# Patient Record
Sex: MALE | Race: WHITE | Employment: FULL TIME | URBAN - METROPOLITAN AREA
[De-identification: names, ages, dates, MRNs, and addresses within clinical notes are randomized per-mention and may not be internally consistent; named-entity substitution may affect disease eponyms.]

---

## 2024-06-30 ENCOUNTER — HOSPITAL ENCOUNTER (INPATIENT)
Age: 49
LOS: 4 days | Discharge: HOME | End: 2024-07-04
Attending: EMERGENCY MEDICINE | Admitting: SURGERY
Payer: COMMERCIAL

## 2024-06-30 ENCOUNTER — APPOINTMENT (OUTPATIENT)
Dept: CT IMAGING | Age: 49
End: 2024-06-30
Payer: COMMERCIAL

## 2024-06-30 ENCOUNTER — ANESTHESIA EVENT (OUTPATIENT)
Dept: OPERATING ROOM | Age: 49
End: 2024-06-30
Payer: COMMERCIAL

## 2024-06-30 ENCOUNTER — ANESTHESIA (OUTPATIENT)
Dept: OPERATING ROOM | Age: 49
End: 2024-06-30
Payer: COMMERCIAL

## 2024-06-30 ENCOUNTER — PREP FOR PROCEDURE (OUTPATIENT)
Dept: SURGERY | Age: 49
End: 2024-06-30

## 2024-06-30 DIAGNOSIS — R10.9 RIGHT SIDED ABDOMINAL PAIN: ICD-10-CM

## 2024-06-30 DIAGNOSIS — K56.609 SMALL BOWEL OBSTRUCTION (HCC): ICD-10-CM

## 2024-06-30 DIAGNOSIS — K45.8 HERNIA, INTERNAL: ICD-10-CM

## 2024-06-30 DIAGNOSIS — K56.600 PARTIAL SMALL BOWEL OBSTRUCTION (HCC): Primary | ICD-10-CM

## 2024-06-30 DIAGNOSIS — Q43.0 MECKEL'S DIVERTICULITIS: ICD-10-CM

## 2024-06-30 LAB
ALBUMIN SERPL-MCNC: 4.4 G/DL (ref 3.4–5)
ALBUMIN/GLOB SERPL: 1.6 {RATIO} (ref 1.1–2.2)
ALP SERPL-CCNC: 73 U/L (ref 40–129)
ALT SERPL-CCNC: 23 U/L (ref 10–40)
ANION GAP SERPL CALCULATED.3IONS-SCNC: 11 MMOL/L (ref 3–16)
AST SERPL-CCNC: 23 U/L (ref 15–37)
BASOPHILS # BLD: 0 K/UL (ref 0–0.2)
BASOPHILS NFR BLD: 0.4 %
BILIRUB SERPL-MCNC: 1.3 MG/DL (ref 0–1)
BILIRUB UR QL STRIP.AUTO: NEGATIVE
BUN SERPL-MCNC: 15 MG/DL (ref 7–20)
CALCIUM SERPL-MCNC: 9.5 MG/DL (ref 8.3–10.6)
CHLORIDE SERPL-SCNC: 99 MMOL/L (ref 99–110)
CLARITY UR: CLEAR
CO2 SERPL-SCNC: 25 MMOL/L (ref 21–32)
COLOR UR: YELLOW
CREAT SERPL-MCNC: 0.9 MG/DL (ref 0.9–1.3)
DEPRECATED RDW RBC AUTO: 13.5 % (ref 12.4–15.4)
EOSINOPHIL # BLD: 0.2 K/UL (ref 0–0.6)
EOSINOPHIL NFR BLD: 1.7 %
GFR SERPLBLD CREATININE-BSD FMLA CKD-EPI: >90 ML/MIN/{1.73_M2}
GLUCOSE BLD-MCNC: 100 MG/DL (ref 70–99)
GLUCOSE SERPL-MCNC: 107 MG/DL (ref 70–99)
GLUCOSE UR STRIP.AUTO-MCNC: NEGATIVE MG/DL
HCT VFR BLD AUTO: 47.6 % (ref 40.5–52.5)
HGB BLD-MCNC: 16.2 G/DL (ref 13.5–17.5)
HGB UR QL STRIP.AUTO: NEGATIVE
KETONES UR STRIP.AUTO-MCNC: 15 MG/DL
LACTATE BLDV-SCNC: 1 MMOL/L (ref 0.4–2)
LEUKOCYTE ESTERASE UR QL STRIP.AUTO: NEGATIVE
LIPASE SERPL-CCNC: 18 U/L (ref 13–60)
LYMPHOCYTES # BLD: 1 K/UL (ref 1–5.1)
LYMPHOCYTES NFR BLD: 9.4 %
MCH RBC QN AUTO: 31.5 PG (ref 26–34)
MCHC RBC AUTO-ENTMCNC: 34 G/DL (ref 31–36)
MCV RBC AUTO: 92.8 FL (ref 80–100)
MONOCYTES # BLD: 0.7 K/UL (ref 0–1.3)
MONOCYTES NFR BLD: 6.6 %
NEUTROPHILS # BLD: 8.5 K/UL (ref 1.7–7.7)
NEUTROPHILS NFR BLD: 81.9 %
NITRITE UR QL STRIP.AUTO: NEGATIVE
PERFORMED ON: ABNORMAL
PH UR STRIP.AUTO: 6 [PH] (ref 5–8)
PLATELET # BLD AUTO: 190 K/UL (ref 135–450)
PMV BLD AUTO: 8.5 FL (ref 5–10.5)
POTASSIUM SERPL-SCNC: 3.9 MMOL/L (ref 3.5–5.1)
PROT SERPL-MCNC: 7.2 G/DL (ref 6.4–8.2)
PROT UR STRIP.AUTO-MCNC: NEGATIVE MG/DL
RBC # BLD AUTO: 5.13 M/UL (ref 4.2–5.9)
SODIUM SERPL-SCNC: 135 MMOL/L (ref 136–145)
SP GR UR STRIP.AUTO: 1.02 (ref 1–1.03)
UA COMPLETE W REFLEX CULTURE PNL UR: ABNORMAL
UA DIPSTICK W REFLEX MICRO PNL UR: ABNORMAL
URN SPEC COLLECT METH UR: ABNORMAL
UROBILINOGEN UR STRIP-ACNC: 0.2 E.U./DL
WBC # BLD AUTO: 10.4 K/UL (ref 4–11)

## 2024-06-30 PROCEDURE — 44120 REMOVAL OF SMALL INTESTINE: CPT | Performed by: SURGERY

## 2024-06-30 PROCEDURE — 81003 URINALYSIS AUTO W/O SCOPE: CPT

## 2024-06-30 PROCEDURE — 6360000002 HC RX W HCPCS: Performed by: ANESTHESIOLOGY

## 2024-06-30 PROCEDURE — 6360000002 HC RX W HCPCS: Performed by: SURGERY

## 2024-06-30 PROCEDURE — 2720000010 HC SURG SUPPLY STERILE: Performed by: SURGERY

## 2024-06-30 PROCEDURE — 3700000001 HC ADD 15 MINUTES (ANESTHESIA): Performed by: SURGERY

## 2024-06-30 PROCEDURE — 74177 CT ABD & PELVIS W/CONTRAST: CPT

## 2024-06-30 PROCEDURE — 83605 ASSAY OF LACTIC ACID: CPT

## 2024-06-30 PROCEDURE — 36415 COLL VENOUS BLD VENIPUNCTURE: CPT

## 2024-06-30 PROCEDURE — 2580000003 HC RX 258: Performed by: ANESTHESIOLOGY

## 2024-06-30 PROCEDURE — 2580000003 HC RX 258: Performed by: PHYSICIAN ASSISTANT

## 2024-06-30 PROCEDURE — 96375 TX/PRO/DX INJ NEW DRUG ADDON: CPT

## 2024-06-30 PROCEDURE — 2709999900 HC NON-CHARGEABLE SUPPLY: Performed by: SURGERY

## 2024-06-30 PROCEDURE — 85025 COMPLETE CBC W/AUTO DIFF WBC: CPT

## 2024-06-30 PROCEDURE — 6360000004 HC RX CONTRAST MEDICATION: Performed by: PHYSICIAN ASSISTANT

## 2024-06-30 PROCEDURE — 96365 THER/PROPH/DIAG IV INF INIT: CPT

## 2024-06-30 PROCEDURE — 96361 HYDRATE IV INFUSION ADD-ON: CPT

## 2024-06-30 PROCEDURE — 0DB80ZZ EXCISION OF SMALL INTESTINE, OPEN APPROACH: ICD-10-PCS | Performed by: SURGERY

## 2024-06-30 PROCEDURE — 1200000000 HC SEMI PRIVATE

## 2024-06-30 PROCEDURE — 83690 ASSAY OF LIPASE: CPT

## 2024-06-30 PROCEDURE — 7100000001 HC PACU RECOVERY - ADDTL 15 MIN: Performed by: SURGERY

## 2024-06-30 PROCEDURE — 96374 THER/PROPH/DIAG INJ IV PUSH: CPT

## 2024-06-30 PROCEDURE — 96372 THER/PROPH/DIAG INJ SC/IM: CPT

## 2024-06-30 PROCEDURE — C2626 INFUSION PUMP, NON-PROG,TEMP: HCPCS | Performed by: SURGERY

## 2024-06-30 PROCEDURE — 2500000003 HC RX 250 WO HCPCS: Performed by: ANESTHESIOLOGY

## 2024-06-30 PROCEDURE — 99223 1ST HOSP IP/OBS HIGH 75: CPT | Performed by: SURGERY

## 2024-06-30 PROCEDURE — 3700000000 HC ANESTHESIA ATTENDED CARE: Performed by: SURGERY

## 2024-06-30 PROCEDURE — 3600000013 HC SURGERY LEVEL 3 ADDTL 15MIN: Performed by: SURGERY

## 2024-06-30 PROCEDURE — 2500000003 HC RX 250 WO HCPCS: Performed by: PHYSICIAN ASSISTANT

## 2024-06-30 PROCEDURE — 80053 COMPREHEN METABOLIC PANEL: CPT

## 2024-06-30 PROCEDURE — 7100000000 HC PACU RECOVERY - FIRST 15 MIN: Performed by: SURGERY

## 2024-06-30 PROCEDURE — 99285 EMERGENCY DEPT VISIT HI MDM: CPT

## 2024-06-30 PROCEDURE — 6360000002 HC RX W HCPCS: Performed by: PHYSICIAN ASSISTANT

## 2024-06-30 PROCEDURE — 3600000003 HC SURGERY LEVEL 3 BASE: Performed by: SURGERY

## 2024-06-30 PROCEDURE — 88307 TISSUE EXAM BY PATHOLOGIST: CPT

## 2024-06-30 RX ORDER — LIDOCAINE HYDROCHLORIDE 20 MG/ML
INJECTION, SOLUTION INFILTRATION; PERINEURAL PRN
Status: DISCONTINUED | OUTPATIENT
Start: 2024-06-30 | End: 2024-06-30 | Stop reason: SDUPTHER

## 2024-06-30 RX ORDER — SODIUM CHLORIDE, SODIUM LACTATE, POTASSIUM CHLORIDE, CALCIUM CHLORIDE 600; 310; 30; 20 MG/100ML; MG/100ML; MG/100ML; MG/100ML
INJECTION, SOLUTION INTRAVENOUS CONTINUOUS PRN
Status: DISCONTINUED | OUTPATIENT
Start: 2024-06-30 | End: 2024-06-30 | Stop reason: SDUPTHER

## 2024-06-30 RX ORDER — ONDANSETRON 2 MG/ML
4 INJECTION INTRAMUSCULAR; INTRAVENOUS ONCE
Status: COMPLETED | OUTPATIENT
Start: 2024-06-30 | End: 2024-06-30

## 2024-06-30 RX ORDER — HYDROMORPHONE HYDROCHLORIDE 2 MG/ML
INJECTION, SOLUTION INTRAMUSCULAR; INTRAVENOUS; SUBCUTANEOUS PRN
Status: DISCONTINUED | OUTPATIENT
Start: 2024-06-30 | End: 2024-06-30 | Stop reason: SDUPTHER

## 2024-06-30 RX ORDER — MEPERIDINE HYDROCHLORIDE 50 MG/ML
12.5 INJECTION INTRAMUSCULAR; INTRAVENOUS; SUBCUTANEOUS EVERY 5 MIN PRN
Status: DISCONTINUED | OUTPATIENT
Start: 2024-06-30 | End: 2024-06-30 | Stop reason: HOSPADM

## 2024-06-30 RX ORDER — NALOXONE HYDROCHLORIDE 0.4 MG/ML
INJECTION, SOLUTION INTRAMUSCULAR; INTRAVENOUS; SUBCUTANEOUS PRN
Status: DISCONTINUED | OUTPATIENT
Start: 2024-06-30 | End: 2024-06-30 | Stop reason: HOSPADM

## 2024-06-30 RX ORDER — ENOXAPARIN SODIUM 100 MG/ML
40 INJECTION SUBCUTANEOUS EVERY 24 HOURS
Status: DISCONTINUED | OUTPATIENT
Start: 2024-06-30 | End: 2024-07-04 | Stop reason: HOSPADM

## 2024-06-30 RX ORDER — SODIUM CHLORIDE 0.9 % (FLUSH) 0.9 %
5-40 SYRINGE (ML) INJECTION EVERY 12 HOURS SCHEDULED
Status: DISCONTINUED | OUTPATIENT
Start: 2024-06-30 | End: 2024-06-30 | Stop reason: HOSPADM

## 2024-06-30 RX ORDER — MORPHINE SULFATE 4 MG/ML
4 INJECTION, SOLUTION INTRAMUSCULAR; INTRAVENOUS ONCE
Status: COMPLETED | OUTPATIENT
Start: 2024-06-30 | End: 2024-06-30

## 2024-06-30 RX ORDER — PROCHLORPERAZINE EDISYLATE 5 MG/ML
5 INJECTION INTRAMUSCULAR; INTRAVENOUS
Status: DISCONTINUED | OUTPATIENT
Start: 2024-06-30 | End: 2024-06-30 | Stop reason: HOSPADM

## 2024-06-30 RX ORDER — SUCCINYLCHOLINE CHLORIDE 20 MG/ML
INJECTION INTRAMUSCULAR; INTRAVENOUS PRN
Status: DISCONTINUED | OUTPATIENT
Start: 2024-06-30 | End: 2024-06-30 | Stop reason: SDUPTHER

## 2024-06-30 RX ORDER — KETAMINE HCL IN NACL, ISO-OSM 100MG/10ML
SYRINGE (ML) INJECTION PRN
Status: DISCONTINUED | OUTPATIENT
Start: 2024-06-30 | End: 2024-06-30 | Stop reason: SDUPTHER

## 2024-06-30 RX ORDER — ROCURONIUM BROMIDE 10 MG/ML
INJECTION, SOLUTION INTRAVENOUS PRN
Status: DISCONTINUED | OUTPATIENT
Start: 2024-06-30 | End: 2024-06-30 | Stop reason: SDUPTHER

## 2024-06-30 RX ORDER — ONDANSETRON 2 MG/ML
4 INJECTION INTRAMUSCULAR; INTRAVENOUS
Status: DISCONTINUED | OUTPATIENT
Start: 2024-06-30 | End: 2024-06-30 | Stop reason: HOSPADM

## 2024-06-30 RX ORDER — DEXAMETHASONE SODIUM PHOSPHATE 4 MG/ML
INJECTION, SOLUTION INTRA-ARTICULAR; INTRALESIONAL; INTRAMUSCULAR; INTRAVENOUS; SOFT TISSUE PRN
Status: DISCONTINUED | OUTPATIENT
Start: 2024-06-30 | End: 2024-06-30 | Stop reason: SDUPTHER

## 2024-06-30 RX ORDER — SODIUM CHLORIDE 9 MG/ML
INJECTION, SOLUTION INTRAVENOUS CONTINUOUS
Status: DISCONTINUED | OUTPATIENT
Start: 2024-07-01 | End: 2024-07-04 | Stop reason: HOSPADM

## 2024-06-30 RX ORDER — ONDANSETRON 2 MG/ML
INJECTION INTRAMUSCULAR; INTRAVENOUS PRN
Status: DISCONTINUED | OUTPATIENT
Start: 2024-06-30 | End: 2024-06-30 | Stop reason: SDUPTHER

## 2024-06-30 RX ORDER — OXYCODONE HYDROCHLORIDE 5 MG/1
5 TABLET ORAL
Status: DISCONTINUED | OUTPATIENT
Start: 2024-06-30 | End: 2024-06-30 | Stop reason: HOSPADM

## 2024-06-30 RX ORDER — ACETAMINOPHEN 650 MG/1
650 SUPPOSITORY RECTAL EVERY 6 HOURS PRN
Status: DISCONTINUED | OUTPATIENT
Start: 2024-06-30 | End: 2024-07-04 | Stop reason: HOSPADM

## 2024-06-30 RX ORDER — IPRATROPIUM BROMIDE AND ALBUTEROL SULFATE 2.5; .5 MG/3ML; MG/3ML
1 SOLUTION RESPIRATORY (INHALATION)
Status: DISCONTINUED | OUTPATIENT
Start: 2024-06-30 | End: 2024-06-30 | Stop reason: HOSPADM

## 2024-06-30 RX ORDER — KETOROLAC TROMETHAMINE 30 MG/ML
30 INJECTION, SOLUTION INTRAMUSCULAR; INTRAVENOUS EVERY 8 HOURS PRN
Status: DISCONTINUED | OUTPATIENT
Start: 2024-06-30 | End: 2024-07-04 | Stop reason: HOSPADM

## 2024-06-30 RX ORDER — SODIUM CHLORIDE 9 MG/ML
INJECTION, SOLUTION INTRAVENOUS PRN
Status: DISCONTINUED | OUTPATIENT
Start: 2024-06-30 | End: 2024-06-30 | Stop reason: HOSPADM

## 2024-06-30 RX ORDER — 0.9 % SODIUM CHLORIDE 0.9 %
1000 INTRAVENOUS SOLUTION INTRAVENOUS ONCE
Status: COMPLETED | OUTPATIENT
Start: 2024-06-30 | End: 2024-06-30

## 2024-06-30 RX ORDER — PROPOFOL 10 MG/ML
INJECTION, EMULSION INTRAVENOUS PRN
Status: DISCONTINUED | OUTPATIENT
Start: 2024-06-30 | End: 2024-06-30 | Stop reason: SDUPTHER

## 2024-06-30 RX ORDER — ONDANSETRON 2 MG/ML
4 INJECTION INTRAMUSCULAR; INTRAVENOUS EVERY 6 HOURS PRN
Status: DISCONTINUED | OUTPATIENT
Start: 2024-06-30 | End: 2024-07-04 | Stop reason: HOSPADM

## 2024-06-30 RX ORDER — BUPIVACAINE HYDROCHLORIDE 2.5 MG/ML
INJECTION, SOLUTION EPIDURAL; INFILTRATION; INTRACAUDAL PRN
Status: DISCONTINUED | OUTPATIENT
Start: 2024-06-30 | End: 2024-06-30 | Stop reason: ALTCHOICE

## 2024-06-30 RX ORDER — OXYCODONE HYDROCHLORIDE 5 MG/1
10 TABLET ORAL PRN
Status: DISCONTINUED | OUTPATIENT
Start: 2024-06-30 | End: 2024-06-30 | Stop reason: HOSPADM

## 2024-06-30 RX ORDER — SODIUM CHLORIDE 0.9 % (FLUSH) 0.9 %
5-40 SYRINGE (ML) INJECTION PRN
Status: DISCONTINUED | OUTPATIENT
Start: 2024-06-30 | End: 2024-06-30 | Stop reason: HOSPADM

## 2024-06-30 RX ORDER — FENTANYL CITRATE 50 UG/ML
INJECTION, SOLUTION INTRAMUSCULAR; INTRAVENOUS PRN
Status: DISCONTINUED | OUTPATIENT
Start: 2024-06-30 | End: 2024-06-30 | Stop reason: SDUPTHER

## 2024-06-30 RX ADMIN — HYDROMORPHONE HYDROCHLORIDE 0.5 MG: 1 INJECTION, SOLUTION INTRAMUSCULAR; INTRAVENOUS; SUBCUTANEOUS at 23:18

## 2024-06-30 RX ADMIN — ROCURONIUM BROMIDE 10 MG: 50 INJECTION, SOLUTION INTRAVENOUS at 22:04

## 2024-06-30 RX ADMIN — IOPAMIDOL 75 ML: 755 INJECTION, SOLUTION INTRAVENOUS at 17:52

## 2024-06-30 RX ADMIN — ENOXAPARIN SODIUM 40 MG: 100 INJECTION SUBCUTANEOUS at 19:38

## 2024-06-30 RX ADMIN — FAMOTIDINE 20 MG: 10 INJECTION, SOLUTION INTRAVENOUS at 16:38

## 2024-06-30 RX ADMIN — FENTANYL CITRATE 50 MCG: 50 INJECTION, SOLUTION INTRAMUSCULAR; INTRAVENOUS at 22:59

## 2024-06-30 RX ADMIN — LIDOCAINE HYDROCHLORIDE 100 MG: 20 INJECTION, SOLUTION INFILTRATION; PERINEURAL at 22:04

## 2024-06-30 RX ADMIN — SUCCINYLCHOLINE CHLORIDE 160 MG: 20 INJECTION, SOLUTION INTRAMUSCULAR; INTRAVENOUS at 22:04

## 2024-06-30 RX ADMIN — ONDANSETRON 4 MG: 2 INJECTION INTRAMUSCULAR; INTRAVENOUS at 22:37

## 2024-06-30 RX ADMIN — SODIUM CHLORIDE 1000 ML: 9 INJECTION, SOLUTION INTRAVENOUS at 16:38

## 2024-06-30 RX ADMIN — MORPHINE SULFATE 4 MG: 4 INJECTION, SOLUTION INTRAMUSCULAR; INTRAVENOUS at 16:38

## 2024-06-30 RX ADMIN — SODIUM CHLORIDE, SODIUM LACTATE, POTASSIUM CHLORIDE, AND CALCIUM CHLORIDE: .6; .31; .03; .02 INJECTION, SOLUTION INTRAVENOUS at 21:54

## 2024-06-30 RX ADMIN — HYDROMORPHONE HYDROCHLORIDE 1 MG: 2 INJECTION, SOLUTION INTRAMUSCULAR; INTRAVENOUS; SUBCUTANEOUS at 21:56

## 2024-06-30 RX ADMIN — MEROPENEM 1000 MG: 1 INJECTION, POWDER, FOR SOLUTION INTRAVENOUS at 19:13

## 2024-06-30 RX ADMIN — FENTANYL CITRATE 50 MCG: 50 INJECTION, SOLUTION INTRAMUSCULAR; INTRAVENOUS at 23:01

## 2024-06-30 RX ADMIN — ROCURONIUM BROMIDE 30 MG: 50 INJECTION, SOLUTION INTRAVENOUS at 22:11

## 2024-06-30 RX ADMIN — ROCURONIUM BROMIDE 10 MG: 50 INJECTION, SOLUTION INTRAVENOUS at 22:33

## 2024-06-30 RX ADMIN — DEXAMETHASONE SODIUM PHOSPHATE 4 MG: 4 INJECTION, SOLUTION INTRAMUSCULAR; INTRAVENOUS at 22:15

## 2024-06-30 RX ADMIN — HYDROMORPHONE HYDROCHLORIDE 1 MG: 2 INJECTION, SOLUTION INTRAMUSCULAR; INTRAVENOUS; SUBCUTANEOUS at 22:37

## 2024-06-30 RX ADMIN — HYDROMORPHONE HYDROCHLORIDE 0.5 MG: 1 INJECTION, SOLUTION INTRAMUSCULAR; INTRAVENOUS; SUBCUTANEOUS at 18:45

## 2024-06-30 RX ADMIN — ONDANSETRON 4 MG: 2 INJECTION INTRAMUSCULAR; INTRAVENOUS at 16:37

## 2024-06-30 RX ADMIN — Medication 25 MG: at 22:16

## 2024-06-30 RX ADMIN — SUGAMMADEX 200 MG: 100 INJECTION, SOLUTION INTRAVENOUS at 22:48

## 2024-06-30 RX ADMIN — PROPOFOL 200 MG: 10 INJECTION, EMULSION INTRAVENOUS at 22:04

## 2024-06-30 ASSESSMENT — PAIN SCALES - GENERAL
PAINLEVEL_OUTOF10: 8
PAINLEVEL_OUTOF10: 8
PAINLEVEL_OUTOF10: 0
PAINLEVEL_OUTOF10: 7
PAINLEVEL_OUTOF10: 8
PAINLEVEL_OUTOF10: 8
PAINLEVEL_OUTOF10: 6

## 2024-06-30 ASSESSMENT — PAIN - FUNCTIONAL ASSESSMENT: PAIN_FUNCTIONAL_ASSESSMENT: 0-10

## 2024-06-30 NOTE — ED NOTES
Placed a consult to General Surgery @ 1843  RE: partial SBO from internal hernia RLQ per KATY Rand Dr. called back @1858

## 2024-06-30 NOTE — ED PROVIDER NOTES
[facility]  ED  EMERGENCY DEPARTMENT ENCOUNTER        Pt Name: Levy La  MRN: [id number]  Birthdate 1975  Date of evaluation: 6/30/2024  Provider: ELDA ANSARI PA-C  PCP: No primary care provider on file.  ED Attending: DO Elena       I have seen and evaluated this patient with my supervising physician Ning Parker DO.    CHIEF COMPLAINT:     Chief Complaint   Patient presents with    Abdominal Pain     Pt to ED for c/o abdominal pain that started at 0330 this morning.        HISTORY OF PRESENT ILLNESS:      History provided by the patient. No limitations.    Levy La is a 48 y.o. male who arrives to the ED by private vehicle.  Patient arrives to the ED complaining of right-sided abdominal pain.  He woke up around 3 to 3:30 AM this morning with the pain.  He describes it as a \"burning\" sensation.  Initially, it seemed to be to the mid to lower abdomen.  Now it is more localized to the right side, specifically to the right lower quadrant.  He has been nauseous today, has not had an appetite.  He denies vomiting.  He denies diarrhea or bowel change.  He denies urinary symptoms.  He tried some over-the-counter antacids for symptoms without relief.  He is remotely had a kidney stone but states this does not feel like a kidney stone.  He states when he was 1 or 2 years old he had a right inguinal hernia repair.  When he was 9 to 11 years old he had a umbilical or ventral hernia repair with a scar seen at the umbilicus.  He denies other abdominal or pelvic surgeries.  Pain rated 6/10 on arrival.  He drinks alcohol socially last night.  He is not an everyday drinker.    Nursing Notes were reviewed     REVIEW OF SYSTEMS:     Review of Systems  Positives and pertinent negatives as per HPI.      PAST MEDICAL HISTORY:     Past Medical History:   Diagnosis Date    Kidney stone        SURGICAL HISTORY:      Past Surgical History:   Procedure Laterality Date    SHOULDER SURGERY    Reflex to Culture Not Indicated    Lactic Acid   Result Value Ref Range    Lactic Acid 1.0 0.4 - 2.0 mmol/L         RADIOLOGY:  All x-ray studies are viewed/reviewed by me.     Formal interpretations per the radiologist are as follows:    CT ABDOMEN PELVIS W IV CONTRAST Additional Contrast? None    Result Date: 6/30/2024  EXAMINATION: CT OF THE ABDOMEN AND PELVIS WITH CONTRAST 6/30/2024 5:44 pm TECHNIQUE: CT of the abdomen and pelvis was performed with the administration of intravenous contrast. Multiplanar reformatted images are provided for review. Automated exposure control, iterative reconstruction, and/or weight based adjustment of the mA/kV was utilized to reduce the radiation dose to as low as reasonably achievable. COMPARISON: None. HISTORY: ORDERING SYSTEM PROVIDED HISTORY: RLQ pain TECHNOLOGIST PROVIDED HISTORY: Reason for exam:->RLQ pain Additional Contrast?->None Decision Support Exception - unselect if not a suspected or confirmed emergency medical condition->Emergency Medical Condition (MA) Reason for Exam: right side abd pain today Additional signs and symptoms: nausea only Relevant Medical/Surgical History: hernia as a child FINDINGS:  topogram shows diffuse small bowel dilation. Lower chest: Heart size is normal. Lungs are mild hypoaeration. Liver: Liver is normal density. No enhancing masses.  Normal enhancement of the intrahepatic vasculature. Spleen:  Normal size.  No enhancing masses Pancreas: No enhancing masses.  No ductal dilation. No adjacent fatty stranding. Gallbladder no calcified stones or sludge.  No pericholecystic fluid.  No wall thickening. Bile ducts: No biliary ductal dilation Adrenals: The adrenal glands are unremarkable Kidneys: Kidneys are normal in appearance.  No hydronephrosis.  No enhancing masses. Norenal stones.  Kidneys are in the excretion phase GI: Stomach is unremarkable.  Proximal small bowel dilation.  The transition point is in the right upper quadrant.  This

## 2024-07-01 LAB
ANION GAP SERPL CALCULATED.3IONS-SCNC: 10 MMOL/L (ref 3–16)
BASOPHILS # BLD: 0 K/UL (ref 0–0.2)
BASOPHILS NFR BLD: 0.1 %
BUN SERPL-MCNC: 12 MG/DL (ref 7–20)
CALCIUM SERPL-MCNC: 8.6 MG/DL (ref 8.3–10.6)
CHLORIDE SERPL-SCNC: 102 MMOL/L (ref 99–110)
CO2 SERPL-SCNC: 24 MMOL/L (ref 21–32)
CREAT SERPL-MCNC: 0.9 MG/DL (ref 0.9–1.3)
DEPRECATED RDW RBC AUTO: 13.5 % (ref 12.4–15.4)
EOSINOPHIL # BLD: 0 K/UL (ref 0–0.6)
EOSINOPHIL NFR BLD: 0.1 %
GFR SERPLBLD CREATININE-BSD FMLA CKD-EPI: >90 ML/MIN/{1.73_M2}
GLUCOSE BLD-MCNC: 114 MG/DL (ref 70–99)
GLUCOSE SERPL-MCNC: 141 MG/DL (ref 70–99)
HCT VFR BLD AUTO: 46.7 % (ref 40.5–52.5)
HGB BLD-MCNC: 15.3 G/DL (ref 13.5–17.5)
LYMPHOCYTES # BLD: 0.6 K/UL (ref 1–5.1)
LYMPHOCYTES NFR BLD: 3.9 %
MAGNESIUM SERPL-MCNC: 1.9 MG/DL (ref 1.8–2.4)
MCH RBC QN AUTO: 30.8 PG (ref 26–34)
MCHC RBC AUTO-ENTMCNC: 32.8 G/DL (ref 31–36)
MCV RBC AUTO: 93.9 FL (ref 80–100)
MONOCYTES # BLD: 1.1 K/UL (ref 0–1.3)
MONOCYTES NFR BLD: 7.2 %
NEUTROPHILS # BLD: 13.6 K/UL (ref 1.7–7.7)
NEUTROPHILS NFR BLD: 88.7 %
PERFORMED ON: ABNORMAL
PHOSPHATE SERPL-MCNC: 3.2 MG/DL (ref 2.5–4.9)
PLATELET # BLD AUTO: 161 K/UL (ref 135–450)
PMV BLD AUTO: 8.7 FL (ref 5–10.5)
POTASSIUM SERPL-SCNC: 4.6 MMOL/L (ref 3.5–5.1)
RBC # BLD AUTO: 4.97 M/UL (ref 4.2–5.9)
SODIUM SERPL-SCNC: 136 MMOL/L (ref 136–145)
WBC # BLD AUTO: 15.4 K/UL (ref 4–11)

## 2024-07-01 PROCEDURE — 2580000003 HC RX 258: Performed by: SURGERY

## 2024-07-01 PROCEDURE — 99024 POSTOP FOLLOW-UP VISIT: CPT | Performed by: SURGERY

## 2024-07-01 PROCEDURE — 80048 BASIC METABOLIC PNL TOTAL CA: CPT

## 2024-07-01 PROCEDURE — 36415 COLL VENOUS BLD VENIPUNCTURE: CPT

## 2024-07-01 PROCEDURE — 83735 ASSAY OF MAGNESIUM: CPT

## 2024-07-01 PROCEDURE — 84100 ASSAY OF PHOSPHORUS: CPT

## 2024-07-01 PROCEDURE — 6370000000 HC RX 637 (ALT 250 FOR IP): Performed by: SURGERY

## 2024-07-01 PROCEDURE — 85025 COMPLETE CBC W/AUTO DIFF WBC: CPT

## 2024-07-01 PROCEDURE — 1200000000 HC SEMI PRIVATE

## 2024-07-01 PROCEDURE — 2500000003 HC RX 250 WO HCPCS: Performed by: SURGERY

## 2024-07-01 PROCEDURE — 6360000002 HC RX W HCPCS: Performed by: SURGERY

## 2024-07-01 RX ORDER — NICOTINE 21 MG/24HR
1 PATCH, TRANSDERMAL 24 HOURS TRANSDERMAL DAILY
Status: DISCONTINUED | OUTPATIENT
Start: 2024-07-01 | End: 2024-07-04 | Stop reason: HOSPADM

## 2024-07-01 RX ORDER — DIPHENHYDRAMINE HYDROCHLORIDE 50 MG/ML
25 INJECTION INTRAMUSCULAR; INTRAVENOUS EVERY 6 HOURS PRN
Status: DISCONTINUED | OUTPATIENT
Start: 2024-07-01 | End: 2024-07-04 | Stop reason: HOSPADM

## 2024-07-01 RX ADMIN — SODIUM CHLORIDE: 9 INJECTION, SOLUTION INTRAVENOUS at 00:09

## 2024-07-01 RX ADMIN — MEROPENEM 1000 MG: 1 INJECTION INTRAVENOUS at 16:00

## 2024-07-01 RX ADMIN — HYDROMORPHONE HYDROCHLORIDE 0.5 MG: 1 INJECTION, SOLUTION INTRAMUSCULAR; INTRAVENOUS; SUBCUTANEOUS at 22:18

## 2024-07-01 RX ADMIN — SODIUM CHLORIDE, PRESERVATIVE FREE 20 MG: 5 INJECTION INTRAVENOUS at 00:24

## 2024-07-01 RX ADMIN — KETOROLAC TROMETHAMINE 30 MG: 30 INJECTION INTRAMUSCULAR; INTRAVENOUS at 10:32

## 2024-07-01 RX ADMIN — SODIUM CHLORIDE: 9 INJECTION, SOLUTION INTRAVENOUS at 10:13

## 2024-07-01 RX ADMIN — HYDROMORPHONE HYDROCHLORIDE 0.5 MG: 1 INJECTION, SOLUTION INTRAMUSCULAR; INTRAVENOUS; SUBCUTANEOUS at 12:31

## 2024-07-01 RX ADMIN — KETOROLAC TROMETHAMINE 30 MG: 30 INJECTION INTRAMUSCULAR; INTRAVENOUS at 02:03

## 2024-07-01 RX ADMIN — MEROPENEM 1000 MG: 1 INJECTION INTRAVENOUS at 02:29

## 2024-07-01 RX ADMIN — ENOXAPARIN SODIUM 40 MG: 100 INJECTION SUBCUTANEOUS at 17:38

## 2024-07-01 RX ADMIN — SODIUM CHLORIDE, PRESERVATIVE FREE 20 MG: 5 INJECTION INTRAVENOUS at 21:01

## 2024-07-01 RX ADMIN — DIPHENHYDRAMINE HYDROCHLORIDE 25 MG: 50 INJECTION, SOLUTION INTRAMUSCULAR; INTRAVENOUS at 02:03

## 2024-07-01 RX ADMIN — KETOROLAC TROMETHAMINE 30 MG: 30 INJECTION INTRAMUSCULAR; INTRAVENOUS at 21:07

## 2024-07-01 RX ADMIN — SODIUM CHLORIDE: 9 INJECTION, SOLUTION INTRAVENOUS at 20:22

## 2024-07-01 RX ADMIN — SODIUM CHLORIDE, PRESERVATIVE FREE 20 MG: 5 INJECTION INTRAVENOUS at 07:30

## 2024-07-01 RX ADMIN — HYDROMORPHONE HYDROCHLORIDE 0.5 MG: 1 INJECTION, SOLUTION INTRAMUSCULAR; INTRAVENOUS; SUBCUTANEOUS at 07:30

## 2024-07-01 RX ADMIN — HYDROMORPHONE HYDROCHLORIDE 0.5 MG: 1 INJECTION, SOLUTION INTRAMUSCULAR; INTRAVENOUS; SUBCUTANEOUS at 17:45

## 2024-07-01 ASSESSMENT — PAIN DESCRIPTION - LOCATION
LOCATION: ABDOMEN;INCISION;THROAT
LOCATION: ABDOMEN
LOCATION: ABDOMEN
LOCATION: ABDOMEN;INCISION
LOCATION: ABDOMEN;INCISION
LOCATION: ABDOMEN
LOCATION: ABDOMEN
LOCATION: ABDOMEN;INCISION

## 2024-07-01 ASSESSMENT — PAIN DESCRIPTION - ORIENTATION
ORIENTATION: MID
ORIENTATION: ANTERIOR;LOWER
ORIENTATION: MID

## 2024-07-01 ASSESSMENT — PAIN - FUNCTIONAL ASSESSMENT
PAIN_FUNCTIONAL_ASSESSMENT: ACTIVITIES ARE NOT PREVENTED
PAIN_FUNCTIONAL_ASSESSMENT: PREVENTS OR INTERFERES SOME ACTIVE ACTIVITIES AND ADLS
PAIN_FUNCTIONAL_ASSESSMENT: ACTIVITIES ARE NOT PREVENTED

## 2024-07-01 ASSESSMENT — PAIN SCALES - GENERAL
PAINLEVEL_OUTOF10: 6
PAINLEVEL_OUTOF10: 6
PAINLEVEL_OUTOF10: 3
PAINLEVEL_OUTOF10: 5
PAINLEVEL_OUTOF10: 6
PAINLEVEL_OUTOF10: 7

## 2024-07-01 ASSESSMENT — PAIN DESCRIPTION - FREQUENCY: FREQUENCY: CONTINUOUS

## 2024-07-01 ASSESSMENT — PAIN DESCRIPTION - DESCRIPTORS
DESCRIPTORS: ACHING
DESCRIPTORS: ACHING
DESCRIPTORS: STABBING
DESCRIPTORS: ACHING

## 2024-07-01 ASSESSMENT — PAIN DESCRIPTION - ONSET: ONSET: GRADUAL

## 2024-07-01 NOTE — PROGRESS NOTES
4 Eyes Skin Assessment     The patient is being assess for  Shift Handoff    I agree that 2 RN's have performed a thorough Head to Toe Skin Assessment on the patient. ALL assessment sites listed below have been assessed.       Areas assessed by both nurses: FT  [x]   Head, Face, and Ears   [x]   Shoulders, Back, and Chest  [x]   Arms, Elbows, and Hands   [x]   Coccyx, Sacrum, and IschIum  [x]   Legs, Feet, and Heels        Does the Patient have Skin Breakdown?  No         [place] Prevention initiated:  No   Wound Care Orders initiated:  No      [facility] nurse consulted for Pressure Injury (Stage 3,4, Unstageable, DTI, NWPT, and Complex wounds), New and Established Ostomies:  No      Nurse 1 eSignature: Electronically signed by Huy Valadez RN on 7/1/24 at 3:08 AM EDT    **SHARE this note so that the co-signing nurse is able to place an eSignature**    Nurse 2 eSignature: Electronically signed by Raquel Guallpa RN on 7/1/24 at 10:07 AM EDT

## 2024-07-01 NOTE — PROGRESS NOTES
Patient educated on use of IS and demonstrates use Yes    Patient and family educated on incisional care and s/s of infection Yes    Patient and family educated on hand hygiene Yes    Patient and family educated on post operative care Yes   - NG tube maintenance    - Pain control    - castorena Removal        Day of surgery patient to side of bed or up to chair for minimum of 30 minutes No , patient educated on the benefits of early mobility  , would want to get up when the pain subsides       Patient performs oral hygiene with CHG oral solution until NG tube discontinued. Yes    If patient does not have NG tube in place, patient to brush teeth and use mouth wash Q shift NA    Patient receives daily bath and linen changes Yes    Patient receives CHG bath until castorena discontinued Yes    SCDs in place NA , chemical prophylaxis ordered     Patient receiving chemical VTE Yes

## 2024-07-01 NOTE — OP NOTE
[address] [address]                            OPERATIVE REPORT      PATIENT NAME: ARIELA ALMANZAR             : 1975  MED REC NO: [id number]                      ROOM: OR [place]  ACCOUNT NO: [id number]                       ADMIT DATE: 2024  PROVIDER: Neville Romano MD      DATE OF PROCEDURE:  2024    SURGEON:  Neville Romano MD    OPERATION PERFORMED:  Exploratory laparotomy with small-bowel resection.    PREOPERATIVE DIAGNOSIS:  Small-bowel obstruction.    POSTOPERATIVE DIAGNOSIS:  Small-bowel obstruction secondary to Meckel's diverticulitis.    ANESTHESIA:  General.    COMPLICATIONS:  None.    ESTIMATED BLOOD LOSS:  Less than 50 mL.    INDICATION FOR OPERATION:  A 48-year-old male presented with acute right-sided abdominal pain.  Imaging showed small bowel obstruction with suspicion of an internal hernia and possible closed-loop small bowel obstruction.  The patient was tender on the right side of his abdomen.  He was having pain despite persistent dosing of IV analgesics.  Given the CT imaging and exam findings, I recommended operative intervention.  The risks and benefits were explained.  The patient understood them, accepted them, and elected to proceed.    DESCRIPTION OF OPERATION:  The patient was brought to the operating room.  General anesthesia was induced.  He was prepped and draped in the usual surgical sterile fashion.  Midline incision was made.  The fascia was divided and the peritoneal cavity was entered.  There was no ascites.  I eviscerated the small bowel, which was dilated.  The distal small bowel was decompressed.  The point of transition was inflamed and indurated Meckel's diverticulum.  There was exudate and fluid consistent with infection along with the phlegmon associated with this.  This was all consistent with acute infection in the deep abdominal organ space related to  acute Meckel's diverticulitis.  On either side of the Meckel's diverticulitis, KANDACE 75 was used to divide the bowel.  Cecilia clamps and silk suture ligatures were used to divide the mesentery of the short segment small bowel resection.  The 2 small bowel loops were aligned and a side-to-side functional end-to-end anastomosis was created with a reload of the KANDACE 75 stapler.  The end hole was closed with a TX stapler.  Staple lines and mesenteric defect were oversewn with 3-0 silk suture.  Hemostasis was confirmed.  There was no other point of obstruction   or abnormality visualized from the ligament of Treitz to the terminal ileum.  NG tube was palpated in good position in the stomach.  A dual lumen On-Q PainBuster system was placed on both sides of the incision.  The midline fascia was closed with #1 PDS suture and tied in the middle.  Skin clips were used to reapproximate the skin.  Bandage was placed.    DISPOSITION:  The patient tolerated the procedure without any acute complication.          CARL GARCÍA MD      D:  06/30/2024 23:07:34     T:  06/30/2024 23:50:04     [facility]/S  Job #:  [id number]     Doc#:  [id number]

## 2024-07-01 NOTE — PROGRESS NOTES
Removed castorena cath, patient tolerated.  Patient unable to urinate at 1723, bladder scanner showing 336ml, Dr. Romano via perfect serve.  Orders to straight cath placed, 300ml output of yellow clear urine. Patient tolerated.

## 2024-07-01 NOTE — PROGRESS NOTES
[facility] GENERAL SURGERY    Surgery Progress Note           POD # 1    PATIENT NAME: Levy La     TODAY'S DATE: 7/1/2024    INTERVAL HISTORY:    Pt feels better.  Just complains of incisional pain.  Denies flatus yet.     OBJECTIVE:   VITALS:  BP (!) 150/80   Pulse 59   Temp 98 °F (36.7 °C) (Oral)   Resp 16   Ht 1.829 m (6')   Wt 97.5 kg (215 lb)   SpO2 95%   BMI 29.16 kg/m²     INTAKE/OUTPUT:    I/O last 3 completed shifts:  In: 1945.8 [I.V.:800; NG/GT:60; IV Piggyback:1085.8]  Out: 1200 [Urine:1100; Emesis/NG output:100]  No intake/output data recorded.              CONSTITUTIONAL:  awake and alert  LUNGS:  clear to auscultation  ABDOMEN:   hypoactive bowel sounds, soft, non-distended, tenderness noted in the incision  INCISION: clean, dry    Data:  CBC:   Recent Labs     06/30/24  1636 07/01/24  0658   WBC 10.4 15.4*   HGB 16.2 15.3   HCT 47.6 46.7    161     BMP:    Recent Labs     06/30/24  1636 07/01/24  0658   * 136   K 3.9 4.6   CL 99 102   CO2 25 24   BUN 15 12   CREATININE 0.9 0.9   GLUCOSE 107* 141*     Hepatic:   Recent Labs     06/30/24  1636   AST 23   ALT 23   BILITOT 1.3*   ALKPHOS 73     Mag:      Recent Labs     07/01/24  0658   MG 1.90      Phos:     Recent Labs     07/01/24  0658   PHOS 3.2      INR: No results for input(s): \"INR\" in the last 72 hours.      Radiology Review: N/A    ASSESSMENT AND PLAN:  48 y.o. male status post small bowel resection for obstruction related to Meckel's diverticulitis.  Robles removal today.  Continue nasogastric decompression and IV fluid hydration until return of GI function.  Encourage ambulation and activity        Electronically signed by MICHELE LUNA MD

## 2024-07-01 NOTE — PLAN OF CARE
Problem: Discharge Planning  Goal: Discharge to home or other facility with appropriate resources  7/1/2024 1915 by Huy Valadez, RN  Outcome: Progressing  7/1/2024 0744 by Raquel Guallpa, RN  Outcome: Progressing     Problem: Pain  Goal: Verbalizes/displays adequate comfort level or baseline comfort level  7/1/2024 1915 by Huy Valadez RN  Outcome: Progressing  7/1/2024 0744 by Raquel Guallpa, RN  Outcome: Progressing

## 2024-07-01 NOTE — CARE COORDINATION
Case Management Assessment  Initial Evaluation    Date/Time of Evaluation: 7/1/2024 1:01 PM  Assessment Completed by: Fahad Nash RN    If patient is discharged prior to next notation, then this note serves as note for discharge by case management.    Patient Name: Levy La                   YOB: 1975  Diagnosis: Hernia, internal [K45.8]  Right sided abdominal pain [R10.9]  Partial small bowel obstruction (HCC) [K56.600]  Meckel's diverticulitis [Q43.0]                   Date / Time: 6/30/2024  3:58 PM    Patient Admission Status: Inpatient   Readmission Risk (Low < 19, Mod (19-27), High > 27): Readmission Risk Score: 3.2    Current PCP: No primary care provider on file.  PCP verified by CM? No (has no PCP, given provider list.)    Chart Reviewed: Yes      History Provided by: Patient, Spouse  Patient Orientation: Alert and Oriented, Person, Place, Situation, Self    Patient Cognition: Alert    Hospitalization in the last 30 days (Readmission):  No    If yes, Readmission Assessment in  Navigator will be completed.    Advance Directives:      Code Status: Full Code   Patient's Primary Decision Maker is: Legal Next of Kin    Primary Decision Maker: HILARY LOGN - Spouse - [phone]    Discharge Planning:    Patient lives with: Spouse/Significant Other Type of Home: House  Primary Care Giver: Self  Patient Support Systems include: Spouse/Significant Other   Current Financial resources: Other (Comment) (BCBS)  Current community resources: None  Current services prior to admission: None            Current DME:              Type of Home Care services:  None    ADLS  Prior functional level: Independent in ADLs/IADLs  Current functional level: Independent in ADLs/IADLs    PT AM-PAC:   /24  OT AM-PAC:   /24    Family can provide assistance at DC: Yes  Would you like Case Management to discuss the discharge plan with any other family members/significant others, and if so, who? No  Plans to  Return to Present Housing: Yes  Other Identified Issues/Barriers to RETURNING to current housing: none  Potential Assistance needed at discharge: N/A            Potential DME:    Patient expects to discharge to: House  Plan for transportation at discharge:      Financial    Payor: BCBS / Plan: BCBS - [place] PPO / Product Type: *No Product type* /     Does insurance require precert for SNF: Yes    Potential assistance Purchasing Medications: No  Meds-to-Beds request:        [id number] - [place], [place] - [address] - P [phone] - F [phone]  [address]  [facility] [address]  Phone: [phone] Fax: [phone]      Notes:    Factors facilitating achievement of predicted outcomes: Family support, Motivated, Cooperative, Pleasant, Sense of humor, and Good insight into deficits    Barriers to discharge: none    Additional Case Management Notes: spoke with patient and wife. Reported lives in 2 story home. 2nd floor bedroom independent on steps. Will be able to get to any follow up appts. Provided PCP list. Currently with NGT. Will need return of GI fx prior to d/c. Fahad Nash RN      The Plan for Transition of Care is related to the following treatment goals of Hernia, internal [K45.8]  Right sided abdominal pain [R10.9]  Partial small bowel obstruction (HCC) [K56.600]  Meckel's diverticulitis [Q43.0]    IF APPLICABLE: The Patient and/or patient representative Levy and his family were provided with a choice of provider and agrees with the discharge plan. Freedom of choice list with basic dialogue that supports the patient's individualized plan of care/goals and shares the quality data associated with the providers was provided to:     Patient Representative Name:       The Patient and/or Patient Representative Agree with the Discharge Plan?      Fahad Nash RN  Case Management Department

## 2024-07-01 NOTE — H&P
[facility]    Department of General Surgery History & Physical    PATIENT NAME: Levy La   YOB: 1975    ADMISSION DATE: 6/30/2024  3:58 PM      TODAY'S DATE: 6/30/2024    Reason for admission:  SBO      HISTORY OF PRESENT ILLNESS:              The patient is a 48 y.o. male who presents with severe, sharp mid and R  side abdomen pain that started early this AM.  No previous similar symptoms.  He had associated nausea.  He thought he had upset stomach from something he ate, but the pain worsened with no alleviating or modifying factors at home.      Past Medical History:        Diagnosis Date    Kidney stone        Past Surgical History:        Procedure Laterality Date    SHOULDER SURGERY     Ventral hernia repair [employer].  RIH repair as child.    Current Medications:   Current Facility-Administered Medications: enoxaparin (LOVENOX) injection 40 mg, 40 mg, SubCUTAneous, Q24H  Prior to Admission medications    Not on File        Allergies:  Penicillins and Valacyclovir    Social History:   TOBACCO:   reports that he has never smoked. He has never used smokeless tobacco.  ETOH:   reports current alcohol use.  DRUGS:   has no history on file for drug use.      Family History:    History reviewed. No pertinent family history.    REVIEW OF SYSTEMS:  He reports no complaints related to the eyes, ears , nose throat or mouth. He denies weight loss.  No chest pain.  No SOB.  No urinary complaints.  No musculoskeletal complaints.  No skin rashes.  No neurologic deficits.  No bleeding tendencies.  GI complaints include abdomen pain.    PHYSICAL EXAM:  VITALS:  BP (!) 147/94   Pulse 53   Temp 98 °F (36.7 °C) (Oral)   Resp 18   Ht 1.829 m (6')   Wt 97.5 kg (215 lb)   SpO2 95%   BMI 29.16 kg/m²     CONSTITUTIONAL:  alert, no apparent distress and normal weight  EYES:  sclera clear  ENT:  normocepalic, without obvious abnormality  NECK:  supple, symmetrical, trachea midline

## 2024-07-01 NOTE — PROGRESS NOTES
Pt a/o. VSS. Shift assessment updated and documented. NGT to CLWS  with minimal output , Incision clean and dry , Pain ball D/c as per orders , Robles in place with adequate urine output , pain mild this morning after Toradol . Patient educated on IS and maintaining turns in bed . Declined dangling , \"states it hurts when I move \" education reinforced with caregiver at bedside. Had itchiness Surgery , Benadryl ordered and given with desired effects.

## 2024-07-01 NOTE — PROGRESS NOTES
Assessment completed and documented. VSS. A/ox4. C/o 6/10 abd pain, given prn pain medication per mar. Patient OOB to chair at this time. Castorena cath in place, draining. NGT to CLWS. Bed locked and in lowest position. Bedside table and call light within reach. Denies further needs at this time.    Patient educated on use of IS and demonstrates use Yes    Patient and family educated on incisional care and s/s of infection Yes    Patient and family educated on hand hygiene Yes    Patient and family educated on post operative care Yes   - NG tube maintenance    - Pain control    - castorena Removal     Day of surgery patient to side of bed or up to chair for minimum of 30 minutes Yes    POD 1 until discharge, patient up to chair by 9 am and TID. Goal to increase mobility  Yes. Patient ambulated hallways x2 today.    Patient performs oral hygiene with CHG oral solution until NG tube discontinued. Yes    Patient receives daily bath and linen changes Yes    Patient receives CHG bath until castorena discontinued Yes    SCDs in place No- patient OOB    Patient receiving chemical VTE Yes

## 2024-07-01 NOTE — BRIEF OP NOTE
Brief Postoperative Note      Patient: Levy La  YOB: 1975  MRN: [id number]    Date of Procedure: 6/30/2024    Pre-Op Diagnosis Codes:     * Small bowel obstruction (HCC) [K56.609]    Post-Op Diagnosis: Same with Meckel's       Procedure:  SBR    Surgeon(s):  Neville Romano MD    Assistant:  Surgical Assistant: Terrence Rizo    Anesthesia: General    Estimated Blood Loss (mL): Minimal    Complications: None    Specimens:   ID Type Source Tests Collected by Time Destination   A : small bowel Specimen Abdomen SURGICAL PATHOLOGY Neville Romano MD 6/30/2024 2224        Implants:  * No implants in log *      Drains:   NG/OG/NJ/NE Tube Nasogastric 18 fr Left nostril (Active)       Urinary Catheter 06/30/24 Robles (Active)       Findings:  Infection Present At Time Of Surgery (PATOS) (choose all levels that have infection present):  - Organ Space infection (below fascia) present as evidenced by fluid consistent with infection and phlegmon  Other Findings: Inflamed Meckel's with secondary SBO    Electronically signed by NEVILLE ROMANO MD on 6/30/2024 at 10:46 PM

## 2024-07-01 NOTE — PLAN OF CARE
Problem: Discharge Planning  Goal: Discharge to home or other facility with appropriate resources  7/1/2024 0744 by Raquel Guallpa, RN  Outcome: Progressing  7/1/2024 0143 by Huy Valadez RN  Outcome: Progressing     Problem: Pain  Goal: Verbalizes/displays adequate comfort level or baseline comfort level  7/1/2024 0744 by Raquel Guallpa, RN  Outcome: Progressing  7/1/2024 0143 by Huy Valadez RN  Outcome: Progressing

## 2024-07-01 NOTE — ED NOTES
TRANSFER - OUT REPORT:    Verbal report given to OR RN on Levy La  being transferred to OR for ordered procedure       Report consisted of patient's Situation, Background, Assessment and   Recommendations(SBAR).     Information from the following report(s) Nurse Handoff Report, Index, ED Encounter Summary, ED SBAR, Adult Overview, Intake/Output, MAR, Recent Results, Procedure Verification, and Neuro Assessment was reviewed with the receiving nurse.    [place] Fall Assessment:    Presents to emergency department  because of falls (Syncope, seizure, or loss of consciousness): No  Age > 70: No  Altered Mental Status, Intoxication with alcohol or substance confusion (Disorientation, impaired judgment, poor safety awaremess, or inability to follow instructions): No  Impaired Mobility: Ambulates or transfers with assistive devices or assistance; Unable to ambulate or transer.: No  Nursing Judgement: No          Lines:   Peripheral IV 06/30/24 Left Antecubital (Active)       Peripheral IV 06/30/24 Right Forearm (Active)        Opportunity for questions and clarification was provided.      Patient transported with:  Registered Nurse

## 2024-07-02 PROCEDURE — 99024 POSTOP FOLLOW-UP VISIT: CPT | Performed by: SURGERY

## 2024-07-02 PROCEDURE — 6370000000 HC RX 637 (ALT 250 FOR IP): Performed by: SURGERY

## 2024-07-02 PROCEDURE — 2580000003 HC RX 258: Performed by: SURGERY

## 2024-07-02 PROCEDURE — 2500000003 HC RX 250 WO HCPCS: Performed by: SURGERY

## 2024-07-02 PROCEDURE — 6360000002 HC RX W HCPCS: Performed by: SURGERY

## 2024-07-02 PROCEDURE — 1200000000 HC SEMI PRIVATE

## 2024-07-02 RX ADMIN — HYDROMORPHONE HYDROCHLORIDE 0.5 MG: 1 INJECTION, SOLUTION INTRAMUSCULAR; INTRAVENOUS; SUBCUTANEOUS at 19:17

## 2024-07-02 RX ADMIN — HYDROMORPHONE HYDROCHLORIDE 0.5 MG: 1 INJECTION, SOLUTION INTRAMUSCULAR; INTRAVENOUS; SUBCUTANEOUS at 22:46

## 2024-07-02 RX ADMIN — SODIUM CHLORIDE, PRESERVATIVE FREE 20 MG: 5 INJECTION INTRAVENOUS at 08:40

## 2024-07-02 RX ADMIN — MEROPENEM 1000 MG: 1 INJECTION INTRAVENOUS at 08:42

## 2024-07-02 RX ADMIN — ENOXAPARIN SODIUM 40 MG: 100 INJECTION SUBCUTANEOUS at 18:02

## 2024-07-02 RX ADMIN — MEROPENEM 1000 MG: 1 INJECTION INTRAVENOUS at 00:37

## 2024-07-02 RX ADMIN — SODIUM CHLORIDE, PRESERVATIVE FREE 20 MG: 5 INJECTION INTRAVENOUS at 21:18

## 2024-07-02 RX ADMIN — PHENOL 1 SPRAY: 1.5 LIQUID ORAL at 19:17

## 2024-07-02 RX ADMIN — HYDROMORPHONE HYDROCHLORIDE 0.5 MG: 1 INJECTION, SOLUTION INTRAMUSCULAR; INTRAVENOUS; SUBCUTANEOUS at 10:28

## 2024-07-02 RX ADMIN — MEROPENEM 1000 MG: 1 INJECTION INTRAVENOUS at 16:52

## 2024-07-02 RX ADMIN — KETOROLAC TROMETHAMINE 30 MG: 30 INJECTION INTRAMUSCULAR; INTRAVENOUS at 21:18

## 2024-07-02 RX ADMIN — KETOROLAC TROMETHAMINE 30 MG: 30 INJECTION INTRAMUSCULAR; INTRAVENOUS at 10:28

## 2024-07-02 RX ADMIN — HYDROMORPHONE HYDROCHLORIDE 0.5 MG: 1 INJECTION, SOLUTION INTRAMUSCULAR; INTRAVENOUS; SUBCUTANEOUS at 02:21

## 2024-07-02 RX ADMIN — HYDROMORPHONE HYDROCHLORIDE 0.5 MG: 1 INJECTION, SOLUTION INTRAMUSCULAR; INTRAVENOUS; SUBCUTANEOUS at 07:28

## 2024-07-02 RX ADMIN — SODIUM CHLORIDE: 9 INJECTION, SOLUTION INTRAVENOUS at 06:33

## 2024-07-02 RX ADMIN — HYDROMORPHONE HYDROCHLORIDE 0.5 MG: 1 INJECTION, SOLUTION INTRAMUSCULAR; INTRAVENOUS; SUBCUTANEOUS at 15:38

## 2024-07-02 RX ADMIN — SODIUM CHLORIDE: 9 INJECTION, SOLUTION INTRAVENOUS at 16:45

## 2024-07-02 ASSESSMENT — PAIN DESCRIPTION - LOCATION
LOCATION: ABDOMEN

## 2024-07-02 ASSESSMENT — PAIN DESCRIPTION - ORIENTATION
ORIENTATION: MID
ORIENTATION: RIGHT
ORIENTATION: MID
ORIENTATION: RIGHT

## 2024-07-02 ASSESSMENT — PAIN SCALES - GENERAL
PAINLEVEL_OUTOF10: 5
PAINLEVEL_OUTOF10: 5
PAINLEVEL_OUTOF10: 7
PAINLEVEL_OUTOF10: 7
PAINLEVEL_OUTOF10: 6
PAINLEVEL_OUTOF10: 5
PAINLEVEL_OUTOF10: 4
PAINLEVEL_OUTOF10: 5
PAINLEVEL_OUTOF10: 6

## 2024-07-02 ASSESSMENT — PAIN DESCRIPTION - DESCRIPTORS
DESCRIPTORS: ACHING
DESCRIPTORS: SHARP
DESCRIPTORS: ACHING
DESCRIPTORS: SHARP
DESCRIPTORS: ACHING
DESCRIPTORS: STABBING;PRESSURE
DESCRIPTORS: SORE

## 2024-07-02 NOTE — CARE COORDINATION
Chart reviewed. POD 2, Continues with NGT trial clamping.  S/P small bowel resection for obstruction related to Meckel's diverticulitis. Trial clamping today. D/c with return of GI function. IPTA  with wife. Fahad Nash RN

## 2024-07-02 NOTE — PROGRESS NOTES
Patient educated on use of IS and demonstrates use Yes    Patient and family educated on incisional care and s/s of infection Yes    Patient and family educated on hand hygiene Yes    Patient and family educated on post operative care Yes   - NG tube maintenance    - Pain control    - castorena Removal / completed 7/1/24    - Insulin Protocol     Day of surgery patient to side of bed or up to chair for minimum of 30 minutes Yes    POD 1 until discharge, patient up to chair by 9 am and TID. Goal to increase mobility  Yes     Patient performs oral hygiene with CHG oral solution until NG tube discontinued. Yes    If patient does not have NG tube in place, patient to brush teeth and use mouth wash Q shift NA    Patient receives daily bath and linen changes NA    Patient receives CHG bath until castorena discontinued NA , Castorena out     SCDs in place , Not ordered pt up and ambulant now     Patient receiving chemical VTE Yes

## 2024-07-02 NOTE — PROGRESS NOTES
Patient educated on use of IS and demonstrates use Yes    Patient and family educated on incisional care and s/s of infection Yes    Patient and family educated on hand hygiene Yes    Patient and family educated on post operative care Yes   - NG tube maintenance    - Pain control    - castorena Removal    - Insulin Protocol     Day of surgery patient to side of bed or up to chair for minimum of 30 minutes Yes    POD 1 until discharge, patient up to chair by 9 am and TID. Goal to increase mobility  Yes    Patient performs oral hygiene with CHG oral solution until NG tube discontinued. Yes    If patient does not have NG tube in place, patient to brush teeth and use mouth wash Q shift Yes    Patient receives daily bath and linen changes Yes    Patient receives CHG bath until castorena discontinued Yes    SCDs in place  No    Patient receiving chemical VTE Yes

## 2024-07-02 NOTE — PROGRESS NOTES
[facility], [place] GENERAL SURGERY    Surgery Progress Note           POD # 2    PATIENT NAME: Levy La     TODAY'S DATE: 7/2/2024    INTERVAL HISTORY:    Pt  doing well overall, no nausea, and getting hungry, but still no flatus/BM.     OBJECTIVE:   VITALS:  BP (!) 151/91   Pulse 57   Temp 98 °F (36.7 °C) (Oral)   Resp 16   Ht 1.829 m (6')   Wt 97.5 kg (215 lb)   SpO2 96%   BMI 29.16 kg/m²     INTAKE/OUTPUT:    I/O last 3 completed shifts:  In: 2789.2 [I.V.:2383.9; NG/GT:120; IV Piggyback:285.4]  Out: 2900 [Urine:1750; Emesis/NG output:1150]  I/O this shift:  In: 3889.4 [I.V.:3590.4; IV Piggyback:299]  Out: 850 [Urine:400; Emesis/NG output:450]              CONSTITUTIONAL:  awake and alert  LUNGS:  clear to auscultation  ABDOMEN:   hypoactive bowel sounds, soft, non-distended, tenderness noted at incision   INCISION: clean, dry, no drainage, healing    Data:  CBC:   Recent Labs     06/30/24  1636 07/01/24  0658   WBC 10.4 15.4*   HGB 16.2 15.3   HCT 47.6 46.7    161     BMP:    Recent Labs     06/30/24  1636 07/01/24  0658   * 136   K 3.9 4.6   CL 99 102   CO2 25 24   BUN 15 12   CREATININE 0.9 0.9   GLUCOSE 107* 141*     Hepatic:   Recent Labs     06/30/24  1636   AST 23   ALT 23   BILITOT 1.3*   ALKPHOS 73     Mag:      Recent Labs     07/01/24  0658   MG 1.90      Phos:     Recent Labs     07/01/24  0658   PHOS 3.2      INR: No results for input(s): \"INR\" in the last 72 hours.      Radiology Review:       ASSESSMENT AND PLAN:  48 y.o. male status post small bowel resection for obstruction related to Meckel's diverticulitis.    - begin clamping NGT 2/4 hours   - increase up OOB   - cont IVF   - Prophy - cont Lovenox, Pepcid         Electronically signed by TASHA LI MD

## 2024-07-02 NOTE — PLAN OF CARE
Pt a&o. Pt verbalized understanding of pain scale and uses appropriately. PRN analgesic administered as ordered and requested. Pt ambulating in halls to help with pain. Pt asking for pain medication appropriately.

## 2024-07-02 NOTE — PROGRESS NOTES
Clamped NGT at 0840 and pt walked halls and did well but at about 1020 he complained of increased pressure/pain. Unclamped pt and admin prn analgesic. Pt denied n/v at this time.

## 2024-07-02 NOTE — PROGRESS NOTES
Agree with assessment from DM Walter. Pt BS hypoactive. Pt tolerating clamping trial thus far. Pt denied passing gas or BM. Pt ambulating in halls. Pt c/o sore throat, prn cepacol throat spray ordered. Pt denied needs.

## 2024-07-02 NOTE — PROGRESS NOTES
Pt a/o. VSS. Shift assessment updated and documented. Continues on NGT to CLWS , canister changed  ,incision dressing is clean , dry and intact , was able to void , Pain needs given as needed.    No

## 2024-07-02 NOTE — PROGRESS NOTES
Pt a&o. VSS. Shift assessment completed and documented. Pt NG was changed from continuous low suction to q2 clamping trial. Pt bowel sounds hypoactive and patient states last BM was 06/30. Pt denies passing flatus. Pt abdominal incision is clean, dry and intact. Pt denies pain when sitting, but states \"sore\" pain when moving. Pt concerns were protocol for advancing diet and timeline for discharge. Updated pt and wife on POC. Pt states that he is going to walk around the unit today. Pt denies needs at the time the writer left the room.

## 2024-07-03 PROCEDURE — 6360000002 HC RX W HCPCS: Performed by: SURGERY

## 2024-07-03 PROCEDURE — 1200000000 HC SEMI PRIVATE

## 2024-07-03 PROCEDURE — 2500000003 HC RX 250 WO HCPCS: Performed by: SURGERY

## 2024-07-03 PROCEDURE — 99024 POSTOP FOLLOW-UP VISIT: CPT | Performed by: SURGERY

## 2024-07-03 PROCEDURE — APPNB45 APP NON BILLABLE 31-45 MINUTES: Performed by: CLINICAL NURSE SPECIALIST

## 2024-07-03 PROCEDURE — 2580000003 HC RX 258: Performed by: SURGERY

## 2024-07-03 PROCEDURE — 6370000000 HC RX 637 (ALT 250 FOR IP): Performed by: SURGERY

## 2024-07-03 RX ADMIN — HYDROMORPHONE HYDROCHLORIDE 0.5 MG: 1 INJECTION, SOLUTION INTRAMUSCULAR; INTRAVENOUS; SUBCUTANEOUS at 06:43

## 2024-07-03 RX ADMIN — KETOROLAC TROMETHAMINE 30 MG: 30 INJECTION INTRAMUSCULAR; INTRAVENOUS at 14:18

## 2024-07-03 RX ADMIN — ENOXAPARIN SODIUM 40 MG: 100 INJECTION SUBCUTANEOUS at 18:09

## 2024-07-03 RX ADMIN — SODIUM CHLORIDE, PRESERVATIVE FREE 20 MG: 5 INJECTION INTRAVENOUS at 21:15

## 2024-07-03 RX ADMIN — MEROPENEM 1000 MG: 1 INJECTION INTRAVENOUS at 16:00

## 2024-07-03 RX ADMIN — MEROPENEM 1000 MG: 1 INJECTION INTRAVENOUS at 00:24

## 2024-07-03 RX ADMIN — MEROPENEM 1000 MG: 1 INJECTION INTRAVENOUS at 08:07

## 2024-07-03 RX ADMIN — HYDROMORPHONE HYDROCHLORIDE 0.5 MG: 1 INJECTION, SOLUTION INTRAMUSCULAR; INTRAVENOUS; SUBCUTANEOUS at 02:52

## 2024-07-03 RX ADMIN — HYDROMORPHONE HYDROCHLORIDE 0.5 MG: 1 INJECTION, SOLUTION INTRAMUSCULAR; INTRAVENOUS; SUBCUTANEOUS at 18:09

## 2024-07-03 RX ADMIN — SODIUM CHLORIDE, PRESERVATIVE FREE 20 MG: 5 INJECTION INTRAVENOUS at 08:07

## 2024-07-03 ASSESSMENT — PAIN DESCRIPTION - LOCATION
LOCATION: ABDOMEN

## 2024-07-03 ASSESSMENT — PAIN DESCRIPTION - DESCRIPTORS
DESCRIPTORS: ACHING
DESCRIPTORS: ACHING
DESCRIPTORS: SHARP;STABBING
DESCRIPTORS: ACHING

## 2024-07-03 ASSESSMENT — PAIN SCALES - GENERAL
PAINLEVEL_OUTOF10: 6
PAINLEVEL_OUTOF10: 5
PAINLEVEL_OUTOF10: 2
PAINLEVEL_OUTOF10: 5
PAINLEVEL_OUTOF10: 5
PAINLEVEL_OUTOF10: 4
PAINLEVEL_OUTOF10: 4
PAINLEVEL_OUTOF10: 5

## 2024-07-03 ASSESSMENT — PAIN SCALES - WONG BAKER: WONGBAKER_NUMERICALRESPONSE: HURTS A LITTLE BIT

## 2024-07-03 ASSESSMENT — PAIN DESCRIPTION - ORIENTATION
ORIENTATION: RIGHT

## 2024-07-03 NOTE — ED PROVIDER NOTES
Emergency Department Attending SUPERVISORY Provider Note  Location: [place] [address] TELE/MED SURG/ONC  6/30/2024     Chief Complaint   Patient presents with    Abdominal Pain     Pt to ED for c/o abdominal pain that started at 0330 this morning.         PATIENT ID:  Levy La is a 48 y.o. male    Past Medical History:   Diagnosis Date    Kidney stone        Levy La was evaluated in the Emergency Department for concerns of abdominal pain, starting overnight last night.  Here in the ER, already discovered to have a partial small bowel obstruction.  Multimodal pain control, says his pain is mostly improved.  Otherwise, no other concerns.  No falls or trauma.  No lightheadedness or dizziness.  Says he never had anything like this before.  Had an inguinal hernia repair, and a previous umbilical hernia repair, but no other significant abdominal scarring.            Vitals:    07/03/24 0252   BP:    Pulse:    Resp: 16   Temp:    SpO2:         BASIC EXAM:  No acute distress.  Interactive, conversational, pleasant.  Respiratory rate regular, lungs are clear to auscultation bilaterally.  No obvious murmurs.  No significant leg swelling.  On my evaluation, patient got Dilaudid already, and says that his abdomen is starting to feel much better, but does have some tenderness to palpation of general abdomen.  May be some minor tympanic abdomen.  No peritoneal signs    CT ABDOMEN PELVIS W IV CONTRAST Additional Contrast? None   Final Result   Partial small bowel obstruction.  There appears to be an internal hernia in   the right upper quadrant.             Labs Reviewed   CBC WITH AUTO DIFFERENTIAL - Abnormal; Notable for the following components:       Result Value    Neutrophils Absolute 8.5 (*)     All other components within normal limits   COMPREHENSIVE METABOLIC PANEL W/ REFLEX TO MG FOR LOW K - Abnormal; Notable for the following components:    Sodium 135 (*)     Glucose 107 (*)     Total Bilirubin 1.3 (*)     All

## 2024-07-03 NOTE — PROGRESS NOTES
[facility] GENERAL SURGERY    Surgery Progress Note           POD # 3    PATIENT NAME: Levy La     TODAY'S DATE: 7/3/2024    INTERVAL HISTORY:    Pt  doing well, having flatus, maury ngt clamping. Hungry. Pain controlled     OBJECTIVE:   VITALS:  BP (!) 153/87   Pulse 55   Temp 98.2 °F (36.8 °C) (Oral)   Resp 16   Ht 1.829 m (6')   Wt 97.5 kg (215 lb)   SpO2 97%   BMI 29.16 kg/m²     INTAKE/OUTPUT:    I/O last 3 completed shifts:  In: 4781.8 [P.O.:120; I.V.:4269.8; IV Piggyback:392]  Out: 2850 [Urine:1000; Emesis/NG output:1850]  I/O this shift:  In: 1397.1 [I.V.:1278.7; IV Piggyback:118.5]  Out: -               CONSTITUTIONAL:  awake and alert  LUNGS:  no crackles or wheezes  ABDOMEN: soft, non-distended, tenderness noted at incision   INCISION: staples intact    Data:  CBC:   Recent Labs     06/30/24  1636 07/01/24  0658   WBC 10.4 15.4*   HGB 16.2 15.3   HCT 47.6 46.7    161       BMP:    Recent Labs     06/30/24  1636 07/01/24  0658   * 136   K 3.9 4.6   CL 99 102   CO2 25 24   BUN 15 12   CREATININE 0.9 0.9   GLUCOSE 107* 141*       Hepatic:   Recent Labs     06/30/24  1636   AST 23   ALT 23   BILITOT 1.3*   ALKPHOS 73       Mag:      Recent Labs     07/01/24  0658   MG 1.90        Phos:     Recent Labs     07/01/24  0658   PHOS 3.2       ASSESSMENT AND PLAN:  48 y.o. male status post small bowel resection for obstruction related to Meckel's diverticulitis.    GI: remove ngt and start clear liquid diet, decrease IV rate  Activity: ambulate with assistance  Prophy - cont Lovenox, Pepcid     Dispo: likely 1-2 days pending diet advancement    Electronically signed by UVALDO Ayoub - HERACLIO     Surgery Staff    I have examined this patient, and read and agree with the note by Noa Chacon CNP from today; more than half of the total time was spent by me on the encounter.     TASHA LI MD

## 2024-07-03 NOTE — PROGRESS NOTES
Patient educated on use of IS and demonstrates use Yes    Patient and family educated on incisional care and s/s of infection Yes    Patient and family educated on hand hygiene Yes    Patient and family educated on post operative care Yes   - NG tube maintenance    - Pain control    - castorena Removal    - Insulin Protocol     Day of surgery patient to side of bed or up to chair for minimum of 30 minutes Yes, AMBULATING IN CUEVAS    POD 1 until discharge, patient up to chair by 9 am and TID. Goal to increase mobility  Yes, AMBULATING IN  CUEVAS    Patient performs oral hygiene with CHG oral solution until NG tube discontinued. Yes    If patient does not have NG tube in place, patient to brush teeth and use mouth wash Q shift NA    Patient receives daily bath and linen changes Yes    Patient receives CHG bath until castorena discontinued NA    SCDs in place NA    Patient receiving chemical VTE Yes

## 2024-07-03 NOTE — CARE COORDINATION
Chart reviewed day 3. Care managed by surgery. NGT now out. Started cl liquid diet. Will need return of GI function. IPTA home with support of wife. Fahad Nash RN

## 2024-07-03 NOTE — PROGRESS NOTES
Pt A&O, Assessment completed. Pt NG tube is clamping trail. Pt tolerating. Pt was given Dilaudid for pain. Pt reports soreness in abdomen. Pt is not passing gas. Pt incision is clean dry and intact. Call light and bedside table within reach.

## 2024-07-03 NOTE — DISCHARGE INSTRUCTIONS
FOLLOW UP APPOINTMENT WITH DR. Romano FROM GENERAL SURGERY in 2 weeks.  OFFICE PHONE # [phone]. PLEASE CALL TO SCHEDULE THIS APPOINTMENT.   OFFICE IS LOCATED AT [facility]  [address]    [facility]    Candido Rodgers M.D.   [facility] Office      [facility]        [facility]               [address]                [address]  Isidoro Sung M.D.              [address]           [address]          [place] [address]         [place] [address]  Samir Saucedo M.D                         [phone]        [facility]                   Neville Romano M.D.          [facility]                                                          POST-OPERATIVE INSTRUCTIONS FOLLOWING SURGERY    You will have pain medicine ordered. Take as directed.    Your incision is closed with:   Staples, these will be removed in the office during your post-op  appointment.    You may shower.  Wash incisions gently, and pat them dry. Do not rub your incisions.    General guidelines for activity:   Avoid strenuous activity or lifting anything heavier than 15 pounds.    It is OK to be up walking around, and up and down stairs.   Do what is comfortable: stop and rest when you feel tired.     Drink plenty of fluids and stay on a bland diet for 2-3 days after discharge.     Do NOT drive until after your first post-operative appointment and while taking your narcotic pain medicine     Watch for signs of infection:  Excessive warmth or bright redness around your incisions  Leakage of bloody or cloudy fluid from you incisions  Fever over 100.5    If you experience constipation:  Increase your water intake  Increase your activity, walking is best.  A stool softener or mild laxative may be necessary if you still have not had a bowel  movement ; call the office for further

## 2024-07-03 NOTE — PROGRESS NOTES
Ngt out and pt tolerated well. Clear liquid menu provided and educated pt and spouse on POC and how to order.

## 2024-07-03 NOTE — PROGRESS NOTES
Patient educated on use of IS and demonstrates use Yes    Patient and family educated on incisional care and s/s of infection Yes    Patient and family educated on hand hygiene Yes    Patient and family educated on post operative care Yes   - NG tube maintenance    - Pain control    - castorena Removal    - Insulin Protocol     Day of surgery patient to side of bed or up to chair for minimum of 30 minutes Yes    POD 1 until discharge, patient up to chair by 9 am and TID. Goal to increase mobility  Yes    Patient performs oral hygiene with CHG oral solution until NG tube discontinued. Yes    If patient does not have NG tube in place, patient to brush teeth and use mouth wash Q shift Yes    Patient receives daily bath and linen changes Yes    Patient receives CHG bath until csatorena discontinued Yes    SCDs in place NO    Patient receiving chemical VTE Yes

## 2024-07-03 NOTE — PLAN OF CARE
Problem: Discharge Planning  Goal: Discharge to home or other facility with appropriate resources  Outcome: Progressing  Flowsheets (Taken 7/3/2024 0233)  Discharge to home or other facility with appropriate resources:   Arrange for needed discharge resources and transportation as appropriate   Identify barriers to discharge with patient and caregiver   Identify discharge learning needs (meds, wound care, etc)   Arrange for interpreters to assist at discharge as needed   Refer to discharge planning if patient needs post-hospital services based on physician order or complex needs related to functional status, cognitive ability or social support system     Problem: Pain  Goal: Verbalizes/displays adequate comfort level or baseline comfort level  7/3/2024 0233 by Violet Mcelroy, RN  Outcome: Progressing  Flowsheets (Taken 7/3/2024 0233)  Verbalizes/displays adequate comfort level or baseline comfort level:   Encourage patient to monitor pain and request assistance   Administer analgesics based on type and severity of pain and evaluate response   Implement non-pharmacological measures as appropriate and evaluate response   Assess pain using appropriate pain scale   Consider cultural and social influences on pain and pain management   Notify Licensed Independent Practitioner if interventions unsuccessful or patient reports new pain

## 2024-07-04 VITALS
DIASTOLIC BLOOD PRESSURE: 83 MMHG | HEIGHT: 72 IN | RESPIRATION RATE: 16 BRPM | BODY MASS INDEX: 29.12 KG/M2 | OXYGEN SATURATION: 94 % | WEIGHT: 215 LBS | SYSTOLIC BLOOD PRESSURE: 153 MMHG | HEART RATE: 58 BPM | TEMPERATURE: 97.6 F

## 2024-07-04 PROBLEM — Q43.0 MECKEL'S DIVERTICULITIS: Status: RESOLVED | Noted: 2024-06-30 | Resolved: 2024-07-04

## 2024-07-04 PROBLEM — K56.609 SMALL BOWEL OBSTRUCTION (HCC): Status: RESOLVED | Noted: 2024-06-30 | Resolved: 2024-07-04

## 2024-07-04 PROCEDURE — 2500000003 HC RX 250 WO HCPCS: Performed by: SURGERY

## 2024-07-04 PROCEDURE — 99024 POSTOP FOLLOW-UP VISIT: CPT | Performed by: SURGERY

## 2024-07-04 PROCEDURE — 6370000000 HC RX 637 (ALT 250 FOR IP): Performed by: SURGERY

## 2024-07-04 PROCEDURE — 6360000002 HC RX W HCPCS: Performed by: SURGERY

## 2024-07-04 PROCEDURE — 2580000003 HC RX 258: Performed by: SURGERY

## 2024-07-04 RX ORDER — OXYCODONE HYDROCHLORIDE AND ACETAMINOPHEN 5; 325 MG/1; MG/1
1 TABLET ORAL EVERY 6 HOURS PRN
Qty: 20 TABLET | Refills: 0 | Status: SHIPPED | OUTPATIENT
Start: 2024-07-04 | End: 2024-07-09

## 2024-07-04 RX ORDER — CIPROFLOXACIN 500 MG/1
500 TABLET, FILM COATED ORAL 2 TIMES DAILY
Qty: 10 TABLET | Refills: 0 | Status: SHIPPED | OUTPATIENT
Start: 2024-07-04 | End: 2024-07-09

## 2024-07-04 RX ORDER — METRONIDAZOLE 500 MG/1
500 TABLET ORAL 3 TIMES DAILY
Qty: 15 TABLET | Refills: 0 | Status: SHIPPED | OUTPATIENT
Start: 2024-07-04 | End: 2024-07-09

## 2024-07-04 RX ADMIN — MEROPENEM 1000 MG: 1 INJECTION INTRAVENOUS at 00:06

## 2024-07-04 RX ADMIN — MEROPENEM 1000 MG: 1 INJECTION INTRAVENOUS at 08:10

## 2024-07-04 RX ADMIN — HYDROMORPHONE HYDROCHLORIDE 0.5 MG: 1 INJECTION, SOLUTION INTRAMUSCULAR; INTRAVENOUS; SUBCUTANEOUS at 05:28

## 2024-07-04 RX ADMIN — SODIUM CHLORIDE, PRESERVATIVE FREE 20 MG: 5 INJECTION INTRAVENOUS at 08:05

## 2024-07-04 RX ADMIN — HYDROMORPHONE HYDROCHLORIDE 0.5 MG: 1 INJECTION, SOLUTION INTRAMUSCULAR; INTRAVENOUS; SUBCUTANEOUS at 00:17

## 2024-07-04 ASSESSMENT — PAIN DESCRIPTION - LOCATION: LOCATION: ABDOMEN

## 2024-07-04 ASSESSMENT — PAIN SCALES - GENERAL
PAINLEVEL_OUTOF10: 5
PAINLEVEL_OUTOF10: 5
PAINLEVEL_OUTOF10: 6

## 2024-07-04 ASSESSMENT — PAIN SCALES - WONG BAKER: WONGBAKER_NUMERICALRESPONSE: HURTS A LITTLE BIT

## 2024-07-04 NOTE — PROGRESS NOTES
Pt on clear diet, NG tube is out, and pt is tolerating well.  Pt received dilaudid for pain per MAR. Pt ambulating to bathroom indep.

## 2024-07-04 NOTE — PROGRESS NOTES
Pt assessment completed and charted. VSS. Pt a/o. Pt is no pain this morning. Pt is ready to advance diet.                 Standard precautions   Bed in lowest position, wheels locked.   Call light within reach.   Bedside table within reach.   Non-skid socks in place.

## 2024-07-04 NOTE — PLAN OF CARE
Problem: Discharge Planning  Goal: Discharge to home or other facility with appropriate resources  Outcome: Progressing  Flowsheets (Taken 7/3/2024 0233)  Discharge to home or other facility with appropriate resources:   Arrange for needed discharge resources and transportation as appropriate   Identify barriers to discharge with patient and caregiver   Identify discharge learning needs (meds, wound care, etc)   Arrange for interpreters to assist at discharge as needed   Refer to discharge planning if patient needs post-hospital services based on physician order or complex needs related to functional status, cognitive ability or social support system     Problem: Pain  Goal: Verbalizes/displays adequate comfort level or baseline comfort level  Outcome: Progressing  Flowsheets (Taken 7/4/2024 0316)  Verbalizes/displays adequate comfort level or baseline comfort level:   Encourage patient to monitor pain and request assistance   Administer analgesics based on type and severity of pain and evaluate response   Assess pain using appropriate pain scale   Implement non-pharmacological measures as appropriate and evaluate response   Consider cultural and social influences on pain and pain management   Notify Licensed Independent Practitioner if interventions unsuccessful or patient reports new pain

## 2024-07-04 NOTE — DISCHARGE SUMMARY
Surgery Discharge Summary    Patient Identification  Levy La is a 48 y.o. male.  :  1975  Admit Date:  2024    Discharge date:   24                                   Disposition: home    Discharge Diagnoses:   Principal Problem (Resolved):    Meckel's diverticulitis  Active Problems:    * No active hospital problems. *  Resolved Problems:    Small bowel obstruction (HCC)      Discharge condition: good    Discharge Medications:     Current Discharge Medication List             Current Discharge Medication List        START taking these medications    Details   ciprofloxacin (CIPRO) 500 MG tablet Take 1 tablet by mouth 2 times daily for 5 days  Qty: 10 tablet, Refills: 0      metroNIDAZOLE (FLAGYL) 500 MG tablet Take 1 tablet by mouth 3 times daily for 5 days  Qty: 15 tablet, Refills: 0      oxyCODONE-acetaminophen (ENDOCET) 5-325 MG per tablet Take 1 tablet by mouth every 6 hours as needed for Pain for up to 5 days. Intended supply: 5 days. Take lowest dose possible to manage pain Max Daily Amount: 4 tablets  Qty: 20 tablet, Refills: 0    Comments: Reduce doses taken as pain becomes manageable  Associated Diagnoses: Meckel's diverticulitis               Most Recent Labs:    CBC: No results for input(s): \"WBC\", \"HGB\", \"HCT\", \"PLT\" in the last 72 hours.  BMP:  No results for input(s): \"NA\", \"K\", \"CL\", \"CO2\", \"BUN\", \"CREATININE\", \"GLUCOSE\" in the last 72 hours.  Hepatic: No results for input(s): \"AST\", \"ALT\", \"BILITOT\", \"ALKPHOS\" in the last 72 hours.    Invalid input(s): \"ALB\"  PT/INR:  No results for input(s): \"INR\" in the last 72 hours.      Consults: none    Surgery: SBR    Patient Instructions:   Activity: no heavy lifting, pushing, pulling for 2 weeks, no driving for 1 weeks or while on analgesics  Diet: As tolerated  Follow-up with Dr. Romano in 2 weeks.    The patient and/or family/patient representatives, were provided education regarding discharge instructions, ongoing

## 2024-07-04 NOTE — PLAN OF CARE
Problem: Discharge Planning  Goal: Discharge to home or other facility with appropriate resources  7/4/2024 0327 by Violet Mcelroy, RN  Outcome: Progressing  Flowsheets (Taken 7/3/2024 0233)  Discharge to home or other facility with appropriate resources:   Arrange for needed discharge resources and transportation as appropriate   Identify barriers to discharge with patient and caregiver   Identify discharge learning needs (meds, wound care, etc)   Arrange for interpreters to assist at discharge as needed   Refer to discharge planning if patient needs post-hospital services based on physician order or complex needs related to functional status, cognitive ability or social support system     Problem: Pain  Goal: Verbalizes/displays adequate comfort level or baseline comfort level  7/4/2024 0327 by Violet cMelroy, RN  Outcome: Progressing  Flowsheets (Taken 7/4/2024 0327)  Verbalizes/displays adequate comfort level or baseline comfort level:   Assess pain using appropriate pain scale   Encourage patient to monitor pain and request assistance   Administer analgesics based on type and severity of pain and evaluate response   Implement non-pharmacological measures as appropriate and evaluate response   Consider cultural and social influences on pain and pain management

## 2024-07-04 NOTE — PROGRESS NOTES
Patient educated on use of IS and demonstrates use Yes    Patient and family educated on incisional care and s/s of infection Yes    Patient and family educated on hand hygiene Yes    Patient and family educated on post operative care Yes      - Pain control       - Insulin Protocol     Day of surgery patient to side of bed or up to chair for minimum of 30 minutes Yes    POD 1 until discharge, patient up to chair by 9 am and TID. Goal to increase mobility  Yes    Patient performs oral hygiene with CHG oral solution until NG tube discontinued.Na    If patient does not have NG tube in place, patient to brush teeth and use mouth wash Q shift NA    Patient receives daily bath and linen changes Yes    Patient receives CHG bath until castorena discontinued NA    SCDs in place NA    Patient receiving chemical VTE Yes

## 2024-07-04 NOTE — PROGRESS NOTES
Pt a/o x4. VSS. All prescriptions, discharge and follow up instructions given to the patient.  The patient verbalizes understanding and denies questions.  All belongings collected and sent with the patient. The patient was discharged off the unit by foot in stable condition.

## 2024-07-04 NOTE — PLAN OF CARE
Problem: Discharge Planning  Goal: Discharge to home or other facility with appropriate resources  7/4/2024 1131 by Noa Daugherty, RN  Outcome: Adequate for Discharge     Problem: Pain  Goal: Verbalizes/displays adequate comfort level or baseline comfort level  7/4/2024 1131 by Noa Daugherty, RN  Outcome: Adequate for Discharge

## 2024-07-17 ENCOUNTER — OFFICE VISIT (OUTPATIENT)
Dept: SURGERY | Age: 49
End: 2024-07-17

## 2024-07-17 VITALS
HEIGHT: 72 IN | DIASTOLIC BLOOD PRESSURE: 76 MMHG | SYSTOLIC BLOOD PRESSURE: 124 MMHG | WEIGHT: 206 LBS | BODY MASS INDEX: 27.9 KG/M2

## 2024-07-17 DIAGNOSIS — Z09 SURGICAL FOLLOW-UP CARE: Primary | ICD-10-CM

## 2024-07-17 PROCEDURE — 99024 POSTOP FOLLOW-UP VISIT: CPT | Performed by: SURGERY

## 2024-10-23 ENCOUNTER — OFFICE VISIT (OUTPATIENT)
Dept: SURGERY | Age: 49
End: 2024-10-23
Payer: COMMERCIAL

## 2024-10-23 VITALS
SYSTOLIC BLOOD PRESSURE: 124 MMHG | HEIGHT: 72 IN | WEIGHT: 206.8 LBS | BODY MASS INDEX: 28.01 KG/M2 | DIASTOLIC BLOOD PRESSURE: 82 MMHG

## 2024-10-23 DIAGNOSIS — K43.6 INCARCERATED VENTRAL HERNIA: Primary | ICD-10-CM

## 2024-10-23 PROCEDURE — 99214 OFFICE O/P EST MOD 30 MIN: CPT | Performed by: SURGERY

## 2024-10-23 RX ORDER — SODIUM CHLORIDE 0.9 % (FLUSH) 0.9 %
5-40 SYRINGE (ML) INJECTION PRN
OUTPATIENT
Start: 2024-10-23

## 2024-10-23 RX ORDER — HEPARIN SODIUM 5000 [USP'U]/ML
5000 INJECTION, SOLUTION INTRAVENOUS; SUBCUTANEOUS EVERY 8 HOURS SCHEDULED
OUTPATIENT
Start: 2024-10-23

## 2024-10-23 RX ORDER — SODIUM CHLORIDE 9 MG/ML
INJECTION, SOLUTION INTRAVENOUS PRN
OUTPATIENT
Start: 2024-10-23

## 2024-10-23 RX ORDER — SODIUM CHLORIDE 0.9 % (FLUSH) 0.9 %
5-40 SYRINGE (ML) INJECTION EVERY 12 HOURS SCHEDULED
OUTPATIENT
Start: 2024-10-23

## 2024-10-24 ENCOUNTER — PREP FOR PROCEDURE (OUTPATIENT)
Dept: SURGERY | Age: 49
End: 2024-10-24

## 2024-10-24 DIAGNOSIS — K43.6 INCARCERATED VENTRAL HERNIA: ICD-10-CM

## 2024-10-30 NOTE — PROGRESS NOTES
PRE OP INSTRUCTION SHEET   1. Do not eat or drink anything after 12 midnight  prior to surgery. This includes no water, chewing gum or mints.   2. Take the following pills will a small sip of water (see MAR)                                        3. Aspirin, Ibuprofen, Advil, Naproxen, Vitamin E, fish oil and other Anti-inflammatory products should be stopped for 5 days before surgery or as directed by your physician.   4. Check with your Doctor regarding stopping Plavix, Coumadin, Lovenox, Fragmin or other blood thinners   5. Do not smoke, and do not drink any alcoholic beverages 24 hours prior to surgery.  This includes NA Beer.   6. You may brush your teeth and gargle the morning of surgery.  DO NOT SWALLOW WATER   7. You MUST make arrangements for a responsible adult to take you home after your surgery. You will not be allowed to leave alone or drive yourself home.  It is strongly suggested someone stay with you the first 24 hrs. Your surgery will be cancelled if you do not have a ride home.   8. A parent/legal guardian must accompany a child scheduled for surgery and plan to stay at the hospital until the child is discharged.  Please do not bring other children with you.   9. Please wear simple, loose fitting clothing to the hospital.  Do not bring valuables (money, credit cards, checkbooks, etc.) Do not wear any makeup (including no eye makeup) or nail polish on your fingers or toes.   10. DO NOT wear any jewelry or piercings on day of surgery.  All body piercing jewelry must be removed.   11. If you have dentures,glasses, or contacts they will be removed before going to the OR; we will provide you a container.    12. Please see your family doctor/and cardiologist for a history & physical and/or concerning medications.  Bring any test results/reports from your physician's office. Have history and labs faxed to [phone]    13. Remember to bring Blood Bank bracelet on the day of  surgery.   14. If you have a Living Will and Durable Power of  for Healthcare, please bring in a copy.   15. Notify your Surgeon if you develop any illness between now and surgery  time, cough, cold, fever, sore throat, nausea, vomiting, etc.  Please notify your surgeon if you experience dizziness, shortness of breath or blurred vision between now & the time of your surgery   16. DO NOT shave your operative site 96 hours prior to surgery. For face & neck surgery, men may use an electric razor 48 hours prior to surgery.   17. Shower with _x__Antibacterial soap (x_chlorhexidine for total joint  Pt's) shower two times before surgery.(the morning of and the night before.   18. To provide excellent care visitors will be limited to one in the room at any given time.  Please call pre admission testing if you any further questions [phone] or [phone]

## 2024-11-05 ENCOUNTER — ANESTHESIA EVENT (OUTPATIENT)
Dept: OPERATING ROOM | Age: 49
End: 2024-11-05
Payer: COMMERCIAL

## 2024-11-12 ENCOUNTER — ANESTHESIA (OUTPATIENT)
Dept: OPERATING ROOM | Age: 49
End: 2024-11-12
Payer: COMMERCIAL

## 2024-11-12 ENCOUNTER — HOSPITAL ENCOUNTER (OUTPATIENT)
Age: 49
Setting detail: OUTPATIENT SURGERY
Discharge: HOME | End: 2024-11-12
Attending: SURGERY | Admitting: SURGERY
Payer: COMMERCIAL

## 2024-11-12 VITALS
DIASTOLIC BLOOD PRESSURE: 73 MMHG | HEART RATE: 57 BPM | TEMPERATURE: 97.6 F | BODY MASS INDEX: 27.9 KG/M2 | OXYGEN SATURATION: 95 % | HEIGHT: 72 IN | SYSTOLIC BLOOD PRESSURE: 118 MMHG | RESPIRATION RATE: 16 BRPM | WEIGHT: 206 LBS

## 2024-11-12 DIAGNOSIS — K43.6 INCARCERATED VENTRAL HERNIA: Primary | ICD-10-CM

## 2024-11-12 PROCEDURE — 49594 RPR AA HRN 1ST 3-10 NCR/STRN: CPT | Performed by: SURGERY

## 2024-11-12 PROCEDURE — 6370000000 HC RX 637 (ALT 250 FOR IP): Performed by: ANESTHESIOLOGY

## 2024-11-12 PROCEDURE — 2500000003 HC RX 250 WO HCPCS: Performed by: ANESTHESIOLOGY

## 2024-11-12 PROCEDURE — 3600000004 HC SURGERY LEVEL 4 BASE: Performed by: SURGERY

## 2024-11-12 PROCEDURE — C1713 ANCHOR/SCREW BN/BN,TIS/BN: HCPCS | Performed by: SURGERY

## 2024-11-12 PROCEDURE — 7100000001 HC PACU RECOVERY - ADDTL 15 MIN: Performed by: SURGERY

## 2024-11-12 PROCEDURE — 6360000002 HC RX W HCPCS: Performed by: NURSE ANESTHETIST, CERTIFIED REGISTERED

## 2024-11-12 PROCEDURE — 3700000000 HC ANESTHESIA ATTENDED CARE: Performed by: SURGERY

## 2024-11-12 PROCEDURE — 6360000002 HC RX W HCPCS: Performed by: ANESTHESIOLOGY

## 2024-11-12 PROCEDURE — 2580000003 HC RX 258: Performed by: ANESTHESIOLOGY

## 2024-11-12 PROCEDURE — 6360000002 HC RX W HCPCS: Performed by: SURGERY

## 2024-11-12 PROCEDURE — 7100000010 HC PHASE II RECOVERY - FIRST 15 MIN: Performed by: SURGERY

## 2024-11-12 PROCEDURE — C1781 MESH (IMPLANTABLE): HCPCS | Performed by: SURGERY

## 2024-11-12 PROCEDURE — 6370000000 HC RX 637 (ALT 250 FOR IP)

## 2024-11-12 PROCEDURE — 2580000003 HC RX 258: Performed by: NURSE ANESTHETIST, CERTIFIED REGISTERED

## 2024-11-12 PROCEDURE — 3600000014 HC SURGERY LEVEL 4 ADDTL 15MIN: Performed by: SURGERY

## 2024-11-12 PROCEDURE — 3700000001 HC ADD 15 MINUTES (ANESTHESIA): Performed by: SURGERY

## 2024-11-12 PROCEDURE — 2709999900 HC NON-CHARGEABLE SUPPLY: Performed by: SURGERY

## 2024-11-12 PROCEDURE — 7100000000 HC PACU RECOVERY - FIRST 15 MIN: Performed by: SURGERY

## 2024-11-12 PROCEDURE — 7100000011 HC PHASE II RECOVERY - ADDTL 15 MIN: Performed by: SURGERY

## 2024-11-12 PROCEDURE — 2500000003 HC RX 250 WO HCPCS: Performed by: NURSE ANESTHETIST, CERTIFIED REGISTERED

## 2024-11-12 DEVICE — CAPSURE PERMANENT FIXATION SYSTEM, 37 CM LENGTH, 5 MM DIAMETER, 30 FASTENERS
Type: IMPLANTABLE DEVICE | Site: ABDOMEN | Status: FUNCTIONAL
Brand: CAPSURE

## 2024-11-12 DEVICE — VENTRALIGHT ST MESH WITH ECHO PS POSITIONING SYSTEM, 6" (15.2 CM), CIRCLE
Type: IMPLANTABLE DEVICE | Site: ABDOMEN | Status: FUNCTIONAL
Brand: VENTRALIGHT

## 2024-11-12 RX ORDER — VANCOMYCIN 1.5 G/300ML
1500 INJECTION, SOLUTION INTRAVENOUS ONCE
Status: COMPLETED | OUTPATIENT
Start: 2024-11-12 | End: 2024-11-12

## 2024-11-12 RX ORDER — SODIUM CHLORIDE, SODIUM LACTATE, POTASSIUM CHLORIDE, CALCIUM CHLORIDE 600; 310; 30; 20 MG/100ML; MG/100ML; MG/100ML; MG/100ML
INJECTION, SOLUTION INTRAVENOUS CONTINUOUS
Status: DISCONTINUED | OUTPATIENT
Start: 2024-11-12 | End: 2024-11-12 | Stop reason: HOSPADM

## 2024-11-12 RX ORDER — DEXAMETHASONE SODIUM PHOSPHATE 4 MG/ML
INJECTION, SOLUTION INTRA-ARTICULAR; INTRALESIONAL; INTRAMUSCULAR; INTRAVENOUS; SOFT TISSUE
Status: DISCONTINUED | OUTPATIENT
Start: 2024-11-12 | End: 2024-11-12 | Stop reason: SDUPTHER

## 2024-11-12 RX ORDER — SODIUM CHLORIDE 0.9 % (FLUSH) 0.9 %
5-40 SYRINGE (ML) INJECTION PRN
Status: DISCONTINUED | OUTPATIENT
Start: 2024-11-12 | End: 2024-11-12 | Stop reason: HOSPADM

## 2024-11-12 RX ORDER — OXYCODONE HYDROCHLORIDE 5 MG/1
5 TABLET ORAL EVERY 6 HOURS PRN
Qty: 24 TABLET | Refills: 0 | Status: SHIPPED | OUTPATIENT
Start: 2024-11-12 | End: 2024-11-19

## 2024-11-12 RX ORDER — MIDAZOLAM HYDROCHLORIDE 1 MG/ML
INJECTION, SOLUTION INTRAMUSCULAR; INTRAVENOUS
Status: DISCONTINUED | OUTPATIENT
Start: 2024-11-12 | End: 2024-11-12 | Stop reason: SDUPTHER

## 2024-11-12 RX ORDER — ACETAMINOPHEN 500 MG
1000 TABLET ORAL
Status: COMPLETED | OUTPATIENT
Start: 2024-11-12 | End: 2024-11-12

## 2024-11-12 RX ORDER — PROPOFOL 10 MG/ML
INJECTION, EMULSION INTRAVENOUS
Status: DISCONTINUED | OUTPATIENT
Start: 2024-11-12 | End: 2024-11-12 | Stop reason: SDUPTHER

## 2024-11-12 RX ORDER — OXYCODONE HYDROCHLORIDE 5 MG/1
5 TABLET ORAL PRN
Status: COMPLETED | OUTPATIENT
Start: 2024-11-12 | End: 2024-11-12

## 2024-11-12 RX ORDER — EPHEDRINE SULFATE 50 MG/ML
INJECTION INTRAVENOUS
Status: DISCONTINUED | OUTPATIENT
Start: 2024-11-12 | End: 2024-11-12 | Stop reason: SDUPTHER

## 2024-11-12 RX ORDER — ACETAMINOPHEN 500 MG
TABLET ORAL
Status: COMPLETED
Start: 2024-11-12 | End: 2024-11-12

## 2024-11-12 RX ORDER — FENTANYL CITRATE 50 UG/ML
INJECTION, SOLUTION INTRAMUSCULAR; INTRAVENOUS
Status: DISCONTINUED | OUTPATIENT
Start: 2024-11-12 | End: 2024-11-12 | Stop reason: SDUPTHER

## 2024-11-12 RX ORDER — SODIUM CHLORIDE 0.9 % (FLUSH) 0.9 %
5-40 SYRINGE (ML) INJECTION EVERY 12 HOURS SCHEDULED
Status: DISCONTINUED | OUTPATIENT
Start: 2024-11-12 | End: 2024-11-12 | Stop reason: SDUPTHER

## 2024-11-12 RX ORDER — OXYCODONE HYDROCHLORIDE 5 MG/1
10 TABLET ORAL PRN
Status: COMPLETED | OUTPATIENT
Start: 2024-11-12 | End: 2024-11-12

## 2024-11-12 RX ORDER — HYDROMORPHONE HYDROCHLORIDE 2 MG/ML
INJECTION, SOLUTION INTRAMUSCULAR; INTRAVENOUS; SUBCUTANEOUS
Status: DISCONTINUED | OUTPATIENT
Start: 2024-11-12 | End: 2024-11-12 | Stop reason: SDUPTHER

## 2024-11-12 RX ORDER — SODIUM CHLORIDE 0.9 % (FLUSH) 0.9 %
5-40 SYRINGE (ML) INJECTION EVERY 12 HOURS SCHEDULED
Status: DISCONTINUED | OUTPATIENT
Start: 2024-11-12 | End: 2024-11-12 | Stop reason: HOSPADM

## 2024-11-12 RX ORDER — BUPIVACAINE HYDROCHLORIDE 5 MG/ML
INJECTION, SOLUTION EPIDURAL; INTRACAUDAL PRN
Status: DISCONTINUED | OUTPATIENT
Start: 2024-11-12 | End: 2024-11-12 | Stop reason: ALTCHOICE

## 2024-11-12 RX ORDER — ONDANSETRON 2 MG/ML
INJECTION INTRAMUSCULAR; INTRAVENOUS
Status: DISCONTINUED | OUTPATIENT
Start: 2024-11-12 | End: 2024-11-12 | Stop reason: SDUPTHER

## 2024-11-12 RX ORDER — ONDANSETRON 2 MG/ML
4 INJECTION INTRAMUSCULAR; INTRAVENOUS
Status: DISCONTINUED | OUTPATIENT
Start: 2024-11-12 | End: 2024-11-12 | Stop reason: HOSPADM

## 2024-11-12 RX ORDER — FENTANYL CITRATE 50 UG/ML
INJECTION, SOLUTION INTRAMUSCULAR; INTRAVENOUS
Status: COMPLETED
Start: 2024-11-12 | End: 2024-11-12

## 2024-11-12 RX ORDER — GLYCOPYRROLATE 0.2 MG/ML
INJECTION INTRAMUSCULAR; INTRAVENOUS
Status: DISCONTINUED | OUTPATIENT
Start: 2024-11-12 | End: 2024-11-12 | Stop reason: SDUPTHER

## 2024-11-12 RX ORDER — SODIUM CHLORIDE 9 MG/ML
INJECTION, SOLUTION INTRAVENOUS
Status: DISCONTINUED | OUTPATIENT
Start: 2024-11-12 | End: 2024-11-12 | Stop reason: SDUPTHER

## 2024-11-12 RX ORDER — SODIUM CHLORIDE 9 MG/ML
INJECTION, SOLUTION INTRAVENOUS PRN
Status: DISCONTINUED | OUTPATIENT
Start: 2024-11-12 | End: 2024-11-12 | Stop reason: HOSPADM

## 2024-11-12 RX ORDER — NALOXONE HYDROCHLORIDE 0.4 MG/ML
INJECTION, SOLUTION INTRAMUSCULAR; INTRAVENOUS; SUBCUTANEOUS PRN
Status: DISCONTINUED | OUTPATIENT
Start: 2024-11-12 | End: 2024-11-12 | Stop reason: HOSPADM

## 2024-11-12 RX ORDER — SODIUM CHLORIDE 9 MG/ML
INJECTION, SOLUTION INTRAVENOUS PRN
Status: DISCONTINUED | OUTPATIENT
Start: 2024-11-12 | End: 2024-11-12 | Stop reason: SDUPTHER

## 2024-11-12 RX ORDER — ROCURONIUM BROMIDE 10 MG/ML
INJECTION, SOLUTION INTRAVENOUS
Status: DISCONTINUED | OUTPATIENT
Start: 2024-11-12 | End: 2024-11-12 | Stop reason: SDUPTHER

## 2024-11-12 RX ORDER — MEPERIDINE HYDROCHLORIDE 25 MG/ML
12.5 INJECTION INTRAMUSCULAR; INTRAVENOUS; SUBCUTANEOUS EVERY 5 MIN PRN
Status: DISCONTINUED | OUTPATIENT
Start: 2024-11-12 | End: 2024-11-12 | Stop reason: HOSPADM

## 2024-11-12 RX ORDER — HEPARIN SODIUM 5000 [USP'U]/ML
5000 INJECTION, SOLUTION INTRAVENOUS; SUBCUTANEOUS EVERY 8 HOURS SCHEDULED
Status: DISCONTINUED | OUTPATIENT
Start: 2024-11-12 | End: 2024-11-12 | Stop reason: HOSPADM

## 2024-11-12 RX ORDER — KETOROLAC TROMETHAMINE 30 MG/ML
INJECTION, SOLUTION INTRAMUSCULAR; INTRAVENOUS
Status: DISCONTINUED | OUTPATIENT
Start: 2024-11-12 | End: 2024-11-12 | Stop reason: SDUPTHER

## 2024-11-12 RX ADMIN — HYDROMORPHONE HYDROCHLORIDE 1.5 MG: 2 INJECTION, SOLUTION INTRAMUSCULAR; INTRAVENOUS; SUBCUTANEOUS at 12:26

## 2024-11-12 RX ADMIN — SUGAMMADEX 200 MG: 100 INJECTION, SOLUTION INTRAVENOUS at 12:18

## 2024-11-12 RX ADMIN — HYDROMORPHONE HYDROCHLORIDE 0.5 MG: 1 INJECTION, SOLUTION INTRAMUSCULAR; INTRAVENOUS; SUBCUTANEOUS at 13:06

## 2024-11-12 RX ADMIN — FENTANYL CITRATE 100 MCG: 50 INJECTION INTRAMUSCULAR; INTRAVENOUS at 11:28

## 2024-11-12 RX ADMIN — Medication 1000 MG: at 14:33

## 2024-11-12 RX ADMIN — SODIUM CHLORIDE: 0.9 INJECTION, SOLUTION INTRAVENOUS at 11:28

## 2024-11-12 RX ADMIN — HYDROMORPHONE HYDROCHLORIDE 0.5 MG: 1 INJECTION, SOLUTION INTRAMUSCULAR; INTRAVENOUS; SUBCUTANEOUS at 13:23

## 2024-11-12 RX ADMIN — FAMOTIDINE 20 MG: 10 INJECTION, SOLUTION INTRAVENOUS at 08:21

## 2024-11-12 RX ADMIN — HYDROMORPHONE HYDROCHLORIDE 0.5 MG: 2 INJECTION, SOLUTION INTRAMUSCULAR; INTRAVENOUS; SUBCUTANEOUS at 11:38

## 2024-11-12 RX ADMIN — ROCURONIUM BROMIDE 50 MG: 10 INJECTION, SOLUTION INTRAVENOUS at 11:31

## 2024-11-12 RX ADMIN — SODIUM CHLORIDE: 0.9 INJECTION, SOLUTION INTRAVENOUS at 11:56

## 2024-11-12 RX ADMIN — FENTANYL CITRATE 100 MCG: 50 INJECTION INTRAMUSCULAR; INTRAVENOUS at 12:35

## 2024-11-12 RX ADMIN — MIDAZOLAM 2 MG: 1 INJECTION INTRAMUSCULAR; INTRAVENOUS at 11:28

## 2024-11-12 RX ADMIN — HYDROMORPHONE HYDROCHLORIDE 0.5 MG: 1 INJECTION, SOLUTION INTRAMUSCULAR; INTRAVENOUS; SUBCUTANEOUS at 14:15

## 2024-11-12 RX ADMIN — OXYCODONE 10 MG: 5 TABLET ORAL at 13:38

## 2024-11-12 RX ADMIN — HYDROMORPHONE HYDROCHLORIDE 0.5 MG: 1 INJECTION, SOLUTION INTRAMUSCULAR; INTRAVENOUS; SUBCUTANEOUS at 12:54

## 2024-11-12 RX ADMIN — VANCOMYCIN 1500 MG: 1.5 INJECTION, SOLUTION INTRAVENOUS at 08:23

## 2024-11-12 RX ADMIN — EPHEDRINE SULFATE 10 MG: 50 INJECTION INTRAVENOUS at 11:53

## 2024-11-12 RX ADMIN — ONDANSETRON 4 MG: 2 INJECTION INTRAMUSCULAR; INTRAVENOUS at 12:05

## 2024-11-12 RX ADMIN — ACETAMINOPHEN 1000 MG: 500 TABLET ORAL at 14:33

## 2024-11-12 RX ADMIN — KETOROLAC TROMETHAMINE 30 MG: 30 INJECTION, SOLUTION INTRAMUSCULAR at 12:30

## 2024-11-12 RX ADMIN — DEXAMETHASONE SODIUM PHOSPHATE 10 MG: 4 INJECTION INTRA-ARTICULAR; INTRALESIONAL; INTRAMUSCULAR; INTRAVENOUS; SOFT TISSUE at 11:40

## 2024-11-12 RX ADMIN — GLYCOPYRROLATE 0.2 MG: 0.2 INJECTION, SOLUTION INTRAMUSCULAR; INTRAVENOUS at 11:28

## 2024-11-12 RX ADMIN — HEPARIN SODIUM 5000 UNITS: 5000 INJECTION INTRAVENOUS; SUBCUTANEOUS at 08:19

## 2024-11-12 RX ADMIN — PROPOFOL 200 MG: 10 INJECTION, EMULSION INTRAVENOUS at 11:31

## 2024-11-12 ASSESSMENT — PAIN SCALES - GENERAL
PAINLEVEL_OUTOF10: 7
PAINLEVEL_OUTOF10: 8
PAINLEVEL_OUTOF10: 8
PAINLEVEL_OUTOF10: 4
PAINLEVEL_OUTOF10: 7
PAINLEVEL_OUTOF10: 6
PAINLEVEL_OUTOF10: 6
PAINLEVEL_OUTOF10: 7
PAINLEVEL_OUTOF10: 9
PAINLEVEL_OUTOF10: 9

## 2024-11-12 ASSESSMENT — PAIN DESCRIPTION - LOCATION
LOCATION: ABDOMEN

## 2024-11-12 ASSESSMENT — PAIN DESCRIPTION - ORIENTATION
ORIENTATION: MID

## 2024-11-12 ASSESSMENT — PAIN - FUNCTIONAL ASSESSMENT: PAIN_FUNCTIONAL_ASSESSMENT: NONE - DENIES PAIN

## 2024-11-12 ASSESSMENT — PAIN DESCRIPTION - DESCRIPTORS
DESCRIPTORS: ACHING;PRESSURE;SORE
DESCRIPTORS: SHARP
DESCRIPTORS: ACHING;SORE
DESCRIPTORS: SHARP
DESCRIPTORS: ACHING;SORE;PRESSURE
DESCRIPTORS: ACHING;SORE;PRESSURE
DESCRIPTORS: ACHING;SORE;SHARP;PRESSURE

## 2024-11-12 ASSESSMENT — PAIN DESCRIPTION - PAIN TYPE
TYPE: ACUTE PAIN;SURGICAL PAIN
TYPE: ACUTE PAIN;SURGICAL PAIN

## 2024-11-12 ASSESSMENT — LIFESTYLE VARIABLES: SMOKING_STATUS: 0

## 2024-11-12 ASSESSMENT — ENCOUNTER SYMPTOMS: SHORTNESS OF BREATH: 0

## 2024-11-12 NOTE — ANESTHESIA PRE PROCEDURE
Department of Anesthesiology  Preprocedure Note       Name:  Levy La   Age:  49 y.o.  :  1975                                          MRN:  [id number]         Date:  2024      Surgeon: Surgeon(s):  Neville Romano MD    Procedure: Procedure(s):  LAPAROSCOPIC VENTRAL HERNIA REPAIR WITH MESH    Medications prior to admission:   Prior to Admission medications    Not on File       Current medications:    Current Facility-Administered Medications   Medication Dose Route Frequency Provider Last Rate Last Admin    famotidine (PEPCID) 20 mg in sodium chloride (PF) 0.9 % 10 mL injection  20 mg IntraVENous Once Aaron Ricardo MD        lactated ringers infusion   IntraVENous Continuous Aaron Ricardo MD        sodium chloride flush 0.9 % injection 5-40 mL  5-40 mL IntraVENous 2 times per day Aaron Ricardo MD        sodium chloride flush 0.9 % injection 5-40 mL  5-40 mL IntraVENous PRN Aaron Ricardo MD        0.9 % sodium chloride infusion   IntraVENous PRN Aaron Ricardo MD        vancomycin (VANCOCIN) 1500 mg in 300 mL IVPB  1,500 mg IntraVENous Once Neville Romano MD        sodium chloride flush 0.9 % injection 5-40 mL  5-40 mL IntraVENous PRN Neville Romano MD        heparin (porcine) injection 5,000 Units  5,000 Units SubCUTAneous 3 times per day Neville Romano MD   5,000 Units at 24 0819       Allergies:    Allergies   Allergen Reactions    Penicillins Rash    Valacyclovir Rash       Problem List:    Patient Active Problem List   Diagnosis Code    Incarcerated ventral hernia K43.6       Past Medical History:        Diagnosis Date    Kidney stone        Past Surgical History:        Procedure Laterality Date    LAPAROTOMY N/A 2024    LAPAROTOMY WITH SMALL BOWEL RESECTION performed by Neville Romano MD at [facility] OR    SHOULDER SURGERY         Social History:    Social History     Tobacco Use

## 2024-11-12 NOTE — PROGRESS NOTES
Patient states pain is 4/10 and acceptable, denies nausea, vital signs stable. Discharge instructions reviewed and given paper copy. Patient states understanding and discharged to home with all belongings per wheelchair.

## 2024-11-12 NOTE — H&P
[facility]      The H&P was reviewed, the patient was examined, and no change has occurred in the patient's condition since the H&P was completed. The indications for the procedure were reviewed, and any questions were answered.     I updated the progress note from 10/23/2024 which is the H&P.     Vitals:    11/12/24 0814   BP: 113/69   Pulse: (!) 46   Resp: 18   Temp: 97.5 °F (36.4 °C)   SpO2: 97%

## 2024-11-12 NOTE — PROGRESS NOTES
Phase I (PACU)  1.  Patient is identified using name and the date of birth.  2.  The patient is free from signs and symptoms of chemical, electrical, laser, radiation, positioning, or transfer/transport injury.  3.  The patient receives appropriate medication(s), safely administered during the Perioperative period.  4.  The patient has wound/tissue perfusion consistent with or improved from baseline levels established preoperatively.  5.  The patient is at or returning to normothermia at the conclusion of the immediate postoperative period.  6.  The patient's fluid, electrolyte, and acid base balances are consistent with or improved from baseline levels established preoperatively.  7.  The patient's pulmonary function is consistent with or improved from baseline levels established preoperatively.  8.  The patient's cardiovascular status is consistent with or improved from baseline levels established preoperatively.  9.  The patient/caregiver participates in decisions affecting his or her Perioperative plan of care.  10.  The patient's care is consistent with the individualized Perioperative plan of care.  11.  The patient's right to privacy is maintained.  12.  The patient is the recipient of competent and ethical care within legal standards of practice.  13.  The patient's value system, lifestyle, ethnicity, and culture are considered, respected, and incorporated in the Perioperative plan of care.  14.  The patient demonstrates and/or reports adequate pain control throughout the the Perioperative period.  15.  The patient's neurological status is consistent with or improved from baseline levels established preoperatively.  16.  The patient/caregiver demonstrates knowledge of the expected responses to the operative or invasive procedure.  17.  Patient/Caregiver has reduced anxiety.  Interventions- Familiarize with environment and equipment.  18.  Other:  19.Other: ICE therapy in place

## 2024-11-12 NOTE — PROGRESS NOTES
Phase II:  1.  Patient is identified using name and the date of birth.  2.  The patient is free from signs and symptoms of chemical, electrical, laser, radiation, positioning, or transfer/transport injury.  3.  The patient receives appropriate medication(s), safely administered during the Perioperative period.  4.  The patient has wound/tissue perfusion consistent with or improved from baseline levels established preoperatively.  5.  The patient is at or returning to normothermia at the conclusion of the immediate postoperative period.  6.  The patient's fluid, electrolyte, and acid base balances are consistent with or improved from baseline levels established preoperatively.  7.  The patient's pulmonary function is consistent with or improved from baseline levels established preoperatively.  8.  The patient's cardiovascular status is consistent with or improved from baseline levels established preoperatively.  9.  The patient/caregiver demonstrates knowledge of nutritional management related to the operative or other invasive procedure.  10.  The patient/caregiver demonstrates knowledge of medication, pain, and wound management.  11.  The patient participates in the rehabilitation process as applicable.  12.  The patient/caregiver participates in decisions affection his or her Perioperative plan of care.  13.  The patient's care is consistent with the individualized Perioperative plan of care.  14.  The patient's right to privacy is maintained.  15.  The patient is the recipient of competent and ethical care within legal standards of practice.  16.  The patient's value system, lifestyle, ethnicity, and culture are considered, respected, and incorporated in the Perioperative plan of care and understands special services available.  17.  The patient demonstrates and/or reports adequate pain control throughout the the Perioperative period.  18.  The patient's neurological status is consistent with or improved from  baseline levels established preoperatively.  19.  The patient/caregiver demonstrates knowledge of the expected responses to the operative or invasive procedure.  20.  Patient/Caregiver has reduced anxiety.  Interventions- Familiarize with environment and equipment.  21. Other:  22. Other: Ice therapy in place, patient tolerating PO liquids and food well, wife at bedside

## 2024-11-12 NOTE — ANESTHESIA POSTPROCEDURE EVALUATION
Department of Anesthesiology  Postprocedure Note    Patient: Levy La  MRN: [id number]  YOB: 1975  Date of evaluation: 11/12/2024    Procedure Summary       Date: 11/12/24 Room / Location: [address]    Anesthesia Start: 1128 Anesthesia Stop: 1239    Procedure: LAPAROSCOPIC VENTRAL HERNIA REPAIR WITH MESH (Abdomen) Diagnosis:       Incarcerated ventral hernia      (Incarcerated ventral hernia [K43.6])    Surgeons: Neville Romano MD Responsible Provider: Aaron Ricardo MD    Anesthesia Type: general ASA Status: 2            Anesthesia Type: No value filed.    Isacc Phase I: Isacc Score: 9    Isacc Phase II:      Anesthesia Post Evaluation    Patient location during evaluation: bedside  Patient participation: complete - patient participated  Level of consciousness: awake and alert  Airway patency: patent  Nausea & Vomiting: no nausea  Cardiovascular status: hemodynamically stable  Respiratory status: acceptable  Hydration status: euvolemic  Pain management: adequate      Vitals:    11/12/24 1240 11/12/24 1245 11/12/24 1300 11/12/24 1315   BP: (!) 159/67 137/68 129/75 130/78   Pulse: 57 53 (!) 44 (!) 47   Resp:   18 18   Temp:       TempSrc:       SpO2: 94% 95% 96% 94%   Weight:       Height:          No notable events documented.

## 2024-11-12 NOTE — DISCHARGE INSTRUCTIONS
[facility]    Candido Rodgers M.D.   [facility] Office      [facility] Office          [facility]               [address]                [address]  Isidoro Sung M.D.              [address]           [address]            [place] [address]         [place] [address]  Samir Saucedo M.D                         [phone]          [facility]                   Neville Romano M.D.            [facility]                                                        POST-OPERATIVE INSTRUCTIONS HERNIA REPAIR    Call the office to schedule your post-operative appointment with your surgeon for two (2) weeks.   You may remove the bandages and shower on Thursday.  Replace the binder after showering.        Place an ice pack over your incisions on and off (15min) at a time for the next 2 days.    General guidelines for activity:      Avoid strenuous activity or lifting anything heavier than 15 pounds.       It is OK to be up and walking around. Going up and down stairs is   also OK.      Do what is comfortable: stop and rest when you feel tired.     You will have pain medicine ordered. Take as directed.     Do NOT drive while taking your narcotic pain medicine.  You may resume driving when you feel capable of responding to urgent situation if needed and not taking prescription pain medication.      Watch for signs of infection:    Excessive warmth or bright redness around your incisions    Leakage of bloody or cloudy fluid from you incisions    Fever over 100.5    If you experience constipation  Increase your water intake.  Increase your activity; walking is best.  A stool softener or mild laxative may be necessary if you still have not had a bowel  movement ; call the office for further instructions.   ANESTHESIA DISCHARGE INSTRUCTIONS    You are under the influence of drugs- do not drink  assist you while at rest, use it at all times for the first 24 hours.  For patients using CPAP machines:  ? Use your CPAP machine during all periods of sleep as usual.  ? Use your CPAP machine during all periods of daytime rest while on pain medicines.  ** Follow up with your primary care doctor for continued care.    IF YOU DO NOT TAKE ALL OF YOUR NARCOTIC PAIN MEDICATION, please dispose of them responsibly. There are drop off boxes in the Emergency Departments 24/7 at both [facility] and [place]. If these locations are not convenient, other options for discarding them can be found at:  [email or web address]    Hospital or office staff may NOT accept any medications to drop off in the cabinet for you.

## 2024-11-12 NOTE — BRIEF OP NOTE
Brief Postoperative Note      Patient: Levy La  YOB: 1975  MRN: [id number]    Date of Procedure: 11/12/2024    Pre-Op Diagnosis Codes:      * Incarcerated ventral hernia [K43.6]    Post-Op Diagnosis: Same       Procedure(s):  LAPAROSCOPIC VENTRAL HERNIA REPAIR WITH MESH    Surgeon(s):  Carl Romano MD    Assistant:  Surgical Assistant: Thea Gu    Anesthesia: General    Estimated Blood Loss (mL): Minimal    Complications: None    Specimens:   * No specimens in log *    Implants:  Implant Name Type Inv. Item Serial No.  Lot No. LRB No. Used Action   DEVICE FIX L37CM PEEK SMOOTH CARLOS 30 ABSRB FAST FOR LAP - [id number]  DEVICE FIX L37CM PEEK SMOOTH CARLOS 30 ABSRB FAST FOR LAP  [employer]WD [id number] N/A 1 Implanted   MESH FLORINDA DIA6IN CIR W/ ECHO PS POS SYS VENTRALIGHT ST - [id number]  MESH FLORINDA DIA6IN CIR W/ ECHO PS POS SYS VENTRALIGHT ST  B[employer]WD [id number] N/A 1 Implanted   DEVICE FIX L37CM PEEK SMOOTH CARLOS 30 ABSRB FAST FOR LAP - [id number]  DEVICE FIX L37CM PEEK SMOOTH CARLOS 30 ABSRB FAST FOR LAP  BARD DAVOL-WD [id number] N/A 1 Implanted         Drains: * No LDAs found *    Findings:  Infection Present At Time Of Surgery (PATOS) (choose all levels that have infection present):  No infection present  Other Findings: As above    Electronically signed by CARL ROMANO MD on 11/12/2024 at 12:15 PM

## 2024-11-17 NOTE — OP NOTE
placed in the midportion of the mesh.  The balloon was deflated and removed.  Hemostasis was confirmed.  We had excellent affixation and overlap.  I deflated the abdomen and removed the trocars.  The fascia at the 12 mm port site was reapproximated with 0 Vicryl suture.  Local anesthetic was infiltrated.  4-0 Vicryl was used to reapproximate the skin at all the incisions.  Benzoin, Steri-Strips, bandage, and abdominal binder were placed.    DISPOSITION:  The patient tolerated the procedure without any acute complication.          CARL GARCÍA MD      D:  11/17/2024 15:21:42     T:  11/17/2024 17:09:12     [facility]/S  Job #:  [id number]     Doc#:  [id number]

## 2024-11-25 ENCOUNTER — OFFICE VISIT (OUTPATIENT)
Dept: SURGERY | Age: 49
End: 2024-11-25

## 2024-11-25 VITALS
WEIGHT: 205 LBS | HEIGHT: 72 IN | DIASTOLIC BLOOD PRESSURE: 80 MMHG | SYSTOLIC BLOOD PRESSURE: 128 MMHG | BODY MASS INDEX: 27.77 KG/M2

## 2024-11-25 DIAGNOSIS — Z09 SURGICAL FOLLOW-UP CARE: Primary | ICD-10-CM

## 2025-06-02 ENCOUNTER — INITIAL CONSULT (OUTPATIENT)
Dept: SURGERY | Age: 50
End: 2025-06-02
Payer: COMMERCIAL

## 2025-06-02 VITALS
BODY MASS INDEX: 26.68 KG/M2 | DIASTOLIC BLOOD PRESSURE: 68 MMHG | WEIGHT: 197 LBS | SYSTOLIC BLOOD PRESSURE: 122 MMHG | HEIGHT: 72 IN

## 2025-06-02 DIAGNOSIS — R10.10 UPPER ABDOMINAL PAIN: Primary | ICD-10-CM

## 2025-06-02 DIAGNOSIS — R10.33 PERIUMBILICAL ABDOMINAL PAIN: ICD-10-CM

## 2025-06-02 PROCEDURE — 99212 OFFICE O/P EST SF 10 MIN: CPT | Performed by: SURGERY

## 2025-06-02 RX ORDER — BUPROPION HYDROCHLORIDE 150 MG/1
150 TABLET, EXTENDED RELEASE ORAL 2 TIMES DAILY
COMMUNITY
Start: 2025-05-14

## 2025-06-02 RX ORDER — MULTIVITAMIN WITH IRON
1 TABLET ORAL DAILY
COMMUNITY

## 2025-06-03 ENCOUNTER — TELEPHONE (OUTPATIENT)
Dept: SURGERY | Age: 50
End: 2025-06-03

## 2025-06-03 NOTE — PROGRESS NOTES
[facility]      S:   Patient presents s/p lap ventral hernia repair 11/2024.   He reports recent recurrent bulge and occasional pain.  He eats OK.  Bowels work OK.    O:   Comfortable.  No distress.      Vitals:    06/02/25 1422   BP: 122/68              Incision sites healing well.  Abdomen with suspected ventral hernia with bulge visible.               A:     Encounter Diagnoses   Name Primary?    Upper abdominal pain Yes    Periumbilical abdominal pain          P:   CT will be ordered to further evaluate symptoms in this patient with previous ventral hernia surgery and need for potential operative planning for recurrent ventral hernia.

## 2025-06-03 NOTE — TELEPHONE ENCOUNTER
DORA/Felicia  auth called re: CT auth approval. Approval from 6-2-2025 through 7-1-2025, auth # [id number].

## 2025-06-06 ENCOUNTER — HOSPITAL ENCOUNTER (OUTPATIENT)
Dept: CT IMAGING | Age: 50
Discharge: HOME | End: 2025-06-06
Attending: SURGERY
Payer: COMMERCIAL

## 2025-06-06 DIAGNOSIS — R10.10 UPPER ABDOMINAL PAIN: ICD-10-CM

## 2025-06-06 PROCEDURE — 74177 CT ABD & PELVIS W/CONTRAST: CPT

## 2025-06-06 PROCEDURE — 6360000004 HC RX CONTRAST MEDICATION: Performed by: SURGERY

## 2025-06-06 RX ORDER — IOPAMIDOL 755 MG/ML
75 INJECTION, SOLUTION INTRAVASCULAR
Status: COMPLETED | OUTPATIENT
Start: 2025-06-06 | End: 2025-06-06

## 2025-06-06 RX ORDER — DIATRIZOATE MEGLUMINE AND DIATRIZOATE SODIUM 660; 100 MG/ML; MG/ML
12 SOLUTION ORAL; RECTAL
Status: DISCONTINUED | OUTPATIENT
Start: 2025-06-06 | End: 2025-06-07 | Stop reason: HOSPADM

## 2025-06-06 RX ADMIN — IOPAMIDOL 75 ML: 755 INJECTION, SOLUTION INTRAVENOUS at 16:40

## 2025-06-06 RX ADMIN — DIATRIZOATE MEGLUMINE AND DIATRIZOATE SODIUM 12 ML: 660; 100 LIQUID ORAL; RECTAL at 16:40

## 2025-07-28 ENCOUNTER — OFFICE VISIT (OUTPATIENT)
Dept: SURGERY | Age: 50
End: 2025-07-28
Payer: COMMERCIAL

## 2025-07-28 VITALS
WEIGHT: 197 LBS | DIASTOLIC BLOOD PRESSURE: 78 MMHG | BODY MASS INDEX: 26.68 KG/M2 | HEIGHT: 72 IN | HEART RATE: 74 BPM | SYSTOLIC BLOOD PRESSURE: 147 MMHG | OXYGEN SATURATION: 98 % | TEMPERATURE: 97.5 F

## 2025-07-28 DIAGNOSIS — K43.2 RECURRENT INCISIONAL HERNIA: Primary | ICD-10-CM

## 2025-07-28 PROCEDURE — 99204 OFFICE O/P NEW MOD 45 MIN: CPT | Performed by: SURGERY

## 2025-07-28 NOTE — PROGRESS NOTES
midline, no masses  ABD: soft/NT/ND  Palpable hernia without reducibility  NEURO: No focal deficits, no obvious CN deficits    IMAGING: CT reviewed with the patient    IMP:49 y.o. male with recurrent, incarcerated, incisional hernia  PLAN: Discussed options with Levy. Challenging scenario given the folded mesh with metal tacks. Will require removal creating a fairly significant defect that will necessitate component separation.  He is going to follow-up in 1 month with Dr. Hancock and CHANI to determine if he is a candidate for robotic repair. If not, will coordinate open an open procedure and schedule.  Pictures taken today.    The pathology and recurrent nature of abdominal wall hernias after multiple repairs were discussed with the patient. He was counseled on medical optimization (nutrition, exercise, local wound care, abstaining from nicotine, etc.) as well as surgical wound optimization (e.g. debridement(s), mesh excision, etc). Clinical photos were obtained. The patient was also advised that the procedure will likely require the extensive lysis of peritoneal adhesions.  Additional conversation regarding reconstructive options - including possible scar revision, adjacent tissue transfer, panniculectomy, and possible staged free fascio/myocutaneous reconstruction +/- creation of AV loop was carried out with the patient. The postoperative care instructions were also elucidated in detail, with the patient receiving a booklet detailing the anticipated care routine. The patient was advised that their hospital stay would likely be several days and can be in excess of 7 days. The risks, benefits, alternatives, outcomes, personnel involved were discussed and all questions were answered in a satisfactory manner according to the patient. Specifically, the risks including, but not limited to: bleeding possibly requiring transfusion or reoperation, infection, seroma, nonhealing, recurrence necessitating reoperation, poor

## (undated) DEVICE — MHCZ GENERAL LAPAROSCOPY: Brand: MEDLINE INDUSTRIES, INC.

## (undated) DEVICE — GOWN,REINF,POLY,AURORA,XLNG/XXL,STRL: Brand: MEDLINE

## (undated) DEVICE — SUTURE PERMAHAND SZ 2-0 L30IN NONABSORBABLE BLK SILK W/O A305H

## (undated) DEVICE — STAPLER EXT SKIN 35 WIDE S STL STPL SQUEEZE HNDL VISISTAT

## (undated) DEVICE — GAUZE SPONGES,8 PLY: Brand: CURITY

## (undated) DEVICE — BANDAGE STRTCH NONWOVEN 4INX2YD COVER ROLL

## (undated) DEVICE — TROCAR: Brand: KII® OPTICAL ACCESS SYSTEM

## (undated) DEVICE — SOLUTION IRRIG 1000ML 0.9% SOD CHL USP POUR PLAS BTL

## (undated) DEVICE — ABDOMINAL BINDER: Brand: DEROYAL

## (undated) DEVICE — Z INACTIVE USE 2635508 SOLUTION IRRIG 500ML 0.9% SOD CHL USP POUR PLAS BTL

## (undated) DEVICE — SUTURE PERMAHAND SZ 2-0 L18IN NONABSORBABLE BLK L26MM SH C012D

## (undated) DEVICE — SYRINGE MED 10ML LUERLOCK TIP W/O SFTY DISP

## (undated) DEVICE — BANDAGE,GAUZE,4.5"X4.1YD,STERILE,LF: Brand: MEDLINE

## (undated) DEVICE — SUTURE PERMAHAND SZ 3-0 L18IN NONABSORBABLE BLK L26MM SH C013D

## (undated) DEVICE — GAUZE,SPONGE,4"X4",8PLY,STRL,LF,10/TRAY: Brand: MEDLINE

## (undated) DEVICE — GLOVE,SURG,SENSICARE SLT,LF,PF,8.5: Brand: MEDLINE

## (undated) DEVICE — Z DISCONTINUED USE 2220241 SUTURE SZ 0 27IN 5/8 CIR UR-6  TAPER PT VIOLET ABSRB VICRYL J603H

## (undated) DEVICE — STRIP SKIN CLOSURE 5X1 IN REINF N WOVEN WHT STERI STRP

## (undated) DEVICE — STAPLER INT L75MM CUT LN L73MM STPL LN L77MM BLU B FRM 8

## (undated) DEVICE — GLOVE ORANGE PI 8 1/2   MSG9085

## (undated) DEVICE — KIT INFUS PMP 270ML 4ML/HR 2ML/SITE SOAK CATH L5IN N NARC

## (undated) DEVICE — RELOAD STPL L75MM OPN H3.8MM CLS 1.5MM WIRE DIA0.2MM REG

## (undated) DEVICE — TROCAR: Brand: KII FIOS FIRST ENTRY

## (undated) DEVICE — Device

## (undated) DEVICE — SUTURE VICRYL + SZ 3-0 L18IN ABSRB UD SH 1/2 CIR TAPERCUT NDL VCP864D

## (undated) DEVICE — TAPE RETEN W6INXL11YD POLY SECUR DSG GATROSTOMY TB

## (undated) DEVICE — GLOVE SURG SZ 85 L12IN THK104MIL CRM LTX SMOOTH PWD ST

## (undated) DEVICE — TUBING, SUCTION, 3/16" X 10', STRAIGHT: Brand: MEDLINE

## (undated) DEVICE — SUTURE VICRYL SZ 4-0 L18IN ABSRB UD L19MM PS-2 3/8 CIR PRIM J496H

## (undated) DEVICE — SPONGE,LAP,18"X18",DLX,XR,ST,5/PK,40/PK: Brand: MEDLINE

## (undated) DEVICE — GOWN,AURORA,NONREINF,RAGLAN,XXL,STERILE: Brand: MEDLINE

## (undated) DEVICE — SUTURE PDS II SZ 1 L36IN ABSRB VLT CT L40MM 1/2 CIR TAPR Z359T

## (undated) DEVICE — BLADE ES L6IN ELASTOMERIC COAT EXT DURABLE BEND UPTO 90DEG

## (undated) DEVICE — OPTIFOAM GENTLE SA, POSTOP, 4X8: Brand: MEDLINE

## (undated) DEVICE — STAPLER INT L60MM REG TISS BLU B FRM 8 FIRING 2 ROW AUTO

## (undated) DEVICE — TROCAR: Brand: KII® SLEEVE

## (undated) DEVICE — BINDER ABD H12IN COT FOR 45-62IN WAIST UNIV PREM 4 PNL DSGN